# Patient Record
Sex: FEMALE | Race: WHITE | NOT HISPANIC OR LATINO | Employment: OTHER | URBAN - METROPOLITAN AREA
[De-identification: names, ages, dates, MRNs, and addresses within clinical notes are randomized per-mention and may not be internally consistent; named-entity substitution may affect disease eponyms.]

---

## 2019-05-17 ENCOUNTER — TRANSFERRED RECORDS (OUTPATIENT)
Dept: HEALTH INFORMATION MANAGEMENT | Facility: CLINIC | Age: 74
End: 2019-05-17

## 2019-05-29 ENCOUNTER — OFFICE VISIT (OUTPATIENT)
Dept: INTERNAL MEDICINE | Facility: CLINIC | Age: 74
End: 2019-05-29
Payer: COMMERCIAL

## 2019-05-29 VITALS
HEIGHT: 60 IN | TEMPERATURE: 97.5 F | DIASTOLIC BLOOD PRESSURE: 106 MMHG | WEIGHT: 181 LBS | SYSTOLIC BLOOD PRESSURE: 190 MMHG | BODY MASS INDEX: 35.53 KG/M2 | OXYGEN SATURATION: 99 % | HEART RATE: 106 BPM

## 2019-05-29 DIAGNOSIS — Z12.31 VISIT FOR SCREENING MAMMOGRAM: ICD-10-CM

## 2019-05-29 DIAGNOSIS — I10 ESSENTIAL HYPERTENSION: Primary | ICD-10-CM

## 2019-05-29 DIAGNOSIS — Z86.0100 HISTORY OF COLONIC POLYPS: ICD-10-CM

## 2019-05-29 DIAGNOSIS — Z85.3 HX: BREAST CANCER: ICD-10-CM

## 2019-05-29 LAB
ALBUMIN SERPL-MCNC: 4.1 G/DL (ref 3.4–5)
ALP SERPL-CCNC: 97 U/L (ref 40–150)
ALT SERPL W P-5'-P-CCNC: 16 U/L (ref 0–50)
ANION GAP SERPL CALCULATED.3IONS-SCNC: 4 MMOL/L (ref 3–14)
AST SERPL W P-5'-P-CCNC: 16 U/L (ref 0–45)
BILIRUB SERPL-MCNC: 0.4 MG/DL (ref 0.2–1.3)
BUN SERPL-MCNC: 17 MG/DL (ref 7–30)
CALCIUM SERPL-MCNC: 8.9 MG/DL (ref 8.5–10.1)
CHLORIDE SERPL-SCNC: 104 MMOL/L (ref 94–109)
CO2 SERPL-SCNC: 30 MMOL/L (ref 20–32)
CREAT SERPL-MCNC: 0.69 MG/DL (ref 0.52–1.04)
CREAT UR-MCNC: 23 MG/DL
GFR SERPL CREATININE-BSD FRML MDRD: 87 ML/MIN/{1.73_M2}
GLUCOSE SERPL-MCNC: 89 MG/DL (ref 70–99)
MICROALBUMIN UR-MCNC: 7 MG/L
MICROALBUMIN/CREAT UR: 30.57 MG/G CR (ref 0–25)
POTASSIUM SERPL-SCNC: 3.8 MMOL/L (ref 3.4–5.3)
PROT SERPL-MCNC: 7.9 G/DL (ref 6.8–8.8)
SODIUM SERPL-SCNC: 138 MMOL/L (ref 133–144)
TSH SERPL DL<=0.005 MIU/L-ACNC: 0.76 MU/L (ref 0.4–4)

## 2019-05-29 PROCEDURE — 82043 UR ALBUMIN QUANTITATIVE: CPT | Performed by: INTERNAL MEDICINE

## 2019-05-29 PROCEDURE — 80053 COMPREHEN METABOLIC PANEL: CPT | Performed by: INTERNAL MEDICINE

## 2019-05-29 PROCEDURE — 99203 OFFICE O/P NEW LOW 30 MIN: CPT | Performed by: INTERNAL MEDICINE

## 2019-05-29 PROCEDURE — 36415 COLL VENOUS BLD VENIPUNCTURE: CPT | Performed by: INTERNAL MEDICINE

## 2019-05-29 PROCEDURE — 84443 ASSAY THYROID STIM HORMONE: CPT | Performed by: INTERNAL MEDICINE

## 2019-05-29 RX ORDER — DOXYCYCLINE HYCLATE 50 MG/1
50 CAPSULE ORAL 2 TIMES DAILY
COMMUNITY
End: 2021-09-27

## 2019-05-29 RX ORDER — LISINOPRIL 20 MG/1
20 TABLET ORAL DAILY
Qty: 30 TABLET | Refills: 1 | Status: SHIPPED | OUTPATIENT
Start: 2019-05-29 | End: 2019-06-18

## 2019-05-29 RX ORDER — TRIAMTERENE AND HYDROCHLOROTHIAZIDE 37.5; 25 MG/1; MG/1
1 CAPSULE ORAL EVERY MORNING
Qty: 30 CAPSULE | Refills: 1 | Status: SHIPPED | OUTPATIENT
Start: 2019-05-29 | End: 2019-07-29

## 2019-05-29 ASSESSMENT — MIFFLIN-ST. JEOR: SCORE: 1253.54

## 2019-05-29 ASSESSMENT — PAIN SCALES - GENERAL: PAINLEVEL: MODERATE PAIN (4)

## 2019-05-29 NOTE — PROGRESS NOTES
"Subjective     Lianet Mendez is a 73 year old female who presents to clinic today for the following health issues:    HPI   Chief Complaint   Patient presents with     Establish Care     Htn     She had been without a doctor, retired and moved her with her son and daughter in law.      She knew her htn and weight were up. Used to be on medications and bp was down then.      Melanoma on left temple, seeing plastics this next week.      No chest pains, no sob.    Past Medical History:   Diagnosis Date     Breast cancer (H) 1995     Essential hypertension      Melanoma (H) 2019     Current Outpatient Medications   Medication     doxycycline hyclate (VIBRAMYCIN) 50 MG capsule     lisinopril (PRINIVIL/ZESTRIL) 20 MG tablet     triamterene-HCTZ (DYAZIDE) 37.5-25 MG capsule     No current facility-administered medications for this visit.      Social History     Tobacco Use     Smoking status: Never Smoker     Smokeless tobacco: Never Used   Substance Use Topics     Alcohol use: None     Drug use: None     Review of Systems  Constitutional-No fevers, chills, or weight changes..  ENT-No earpain, sore throat, voice changes or rhinitis.  Cardiac-No chest pain or palpitations.  Respiratory-No cough, sob, or hemoptysis.  GI-No nausea, vomitting, diarrhea, constipation, or blood in the stool.    Physical Exam  BP (!) 190/106   Pulse 106   Temp 97.5  F (36.4  C) (Temporal)   Ht 1.518 m (4' 11.75\")   Wt 82.1 kg (181 lb)   SpO2 99%   BMI 35.65 kg/m    General Appearance-healthy, alert, no distress  Cardiac-regular rate and rhythm  with normal S1, S2 ; no murmur, rub or gallops  Lungs-clear to auscultation  GI-Soft, nontender.  Normal bowel sounds.  No hepatosplenomegaly or abnormal masses  Extremities-no peripheral edema, peripheral pulses normal      ASSESSMENT:  This is a new patient, 73-year-old woman is here to establish care.  She has multiple issues going on including melanoma, hypertension, need for screening " examinations.    Melanoma on the left temple she does have a biopsied area and is seen plastic surgery working with dermatology.    Hypertension the patient has elevated blood pressure today at 190/106 she is asymptomatic.  We will start her back on lisinopril 20 and Dyazide 37.5-25, check her labs today recheck her blood pressure in 1 week.    History of breast cancer on the right side we will get her set up for mammogram.    History of colon polyps we will get her set up for a colonoscopy for general screening.    Follow-up in 4 to 6 weeks for a annual wellness examination.    Electronically signed by Michael Zambrano MD

## 2019-05-30 ENCOUNTER — TELEPHONE (OUTPATIENT)
Dept: INTERNAL MEDICINE | Facility: CLINIC | Age: 74
End: 2019-05-30

## 2019-05-30 NOTE — TELEPHONE ENCOUNTER
Called patient. Left message.  Racheal Maya MA     Ekstra sivilari dinlen ve iç  Tamiflujohnson baslatin ya da yarin sonuçlari bekleyin  Influenza sorunlari için yarin gel  Paul Lolling tartisildi  Tylenol veya Motrin agri veya ates için gerektigi gibi  OTC öksürük ilacini emzirirken kullanmak için güvenli oldugu OB ile görüsün ve tartisin  Clois Roebling, bogaz agrisini yatistirmaya yardimci olabilir  Serin sis nemlendirme yararli olabilir  Geralynn Nipper PCP ile takip severo  Kötülesen semptomlar, inatçi ates, SOB veya solunum zorlugu ile ER'ye gidin       AYAKTA TEDAV?:     influenza virüsünden kaynaklanan bir enfeksiyondur  , hasta ki?iden di?erlerine öksürme, hap??rma veya yak?n temas yoluyla kolayl?kla bula? ?r  Bulgu ve belirtiler ortaya ç?kt?ktan 1 hafta veya daha uzun bir süre sonra gribi ba?kalar?na bula? t?rabilirsiniz  Yayg?n bulgu ve belirtiler a?a??dakileri içerir:   · Ate? ve ü?üme    · Ba? a?r?lar?, vücut a?r?lar? ve nissa veya eklem a?r?s?    · Öksürük, burun ak?nt?s? ve irene?az a?r?s?    · ? ?tahs?zl?k, bulant?, kusma veya ishal    · Yorgunluk    · Nefes jcarlos güçlük  A?a??daki belirtilerden birini görürseniz 911'i aray?n:   · Nefes jcarlos güçlük ya? ?yorsunuz ve dudaklar?n?z?n rengi mor Theadore Ke görünüyor  · Nöbet geçiriyorsunuz  ?u durumlarda derhal yard?m isteyin:   · Ba? ?n?z dönüyor veya daha az miktarda idrar yap?yor veya hiç idrar yapm?yorsunuz  · Ensenizde sertlik ve ba? a?r?s?n?n uri? s?ra kendinizi yorgun hissediyorsunuz veya ak?l toya? ??kl??? ya??yorsunuz  · Cleotha Net bir a?r? Velinda Lanius bas?nç rodger  · Nefes jcarlos güçlük, kusma veya ishal gibi belirtileriniz kötüle? iyor  · Ate? ve öksürük gibi belirtileriniz önce iyile? iyor gibi görünse de sonras?nda kötüle?ti   A?a??daki durumlarda sa?l?k görevlinizle görü? ün:   · True Nanas? n?zda Elnoria Lathe a?r? Michael Tam halsizlik rodger  · Katherene Block milan?m?n?z hakk?nda soru ya da endi?eleriniz rodger     tedavisi  a?a??zakia wittini içerebilir:  · Asetaminofen  a?r?y? azalt?r ve ate?i dü?ürür  Reçetesiz olarak temin edilebilir  ?lac? ne kadar ve ne s?kl?kla alman?z gerekti?ini sorun  [place]  Asetaminofen do?ru ?ekilde al?nmazsa karaci?ere zarar verebilir  · NSAID'ler , örne?in ibuprofen; ? i?meyi, a?r?y? azalt?r ve ate?i dü?ürmeye yard?mc? olur  Bu ilaç, doktor tavsiyesi olsa da olmasa da al?nabilir  NSAID'ler, belirli ki?ilerde mide kanamas?na ya da böbrek rahats?zl?klar?na neden olabilir  Jean-Claude suland?r?c? ilaç kullan?yorsan?z, NSAID'lerin sizin için güvenli olup olmad???n? sa?l?k görevlinize mutlaka sorun  ?laç etiketini kesinlikle okuyun ve üzerinde belirtilen talimatlar? uygulay?n  · Virüs Önleyiciler  viral enfeksiyonla sava?man?za yard?mc? olur  Belirtilerinizi yönetme:   · Dinlenin  iyile? menize yard?mc? olmas? için mümkün oldu?unca dinlenin  · Belirtilen ?ekilde s?v? tüketin  peterson Drena Karvonen? önlemeye yard?mc? olur  Her gün içmeniz gereken s?v? miktar? n? ve sizin için en uygun mireille s?v?lar? dan???n   Gribin bula?mas?n? önleme:   · Ellerinizi s?k s?k y?akshat?n  Sabun ve peterson kullan?n  Banyoyu kulland?ktan, çocuk bezi de? i?tirdikten veya hap??rd?ktan sonra ellerinizi y?akshat?n  Yemek haz?rlamadan önce ellerinizi y?akshat?n  Sabun ve peterson yoksa jel el temizleyici [employer] y?kamadan önce gözlerinize, burnunuza ya da a?z?n?za dokunmay?n            · Hap??r?rken veya öksürürken a?z?n?z? kapat?n   Bir mendile veya kolunuzu bükerek iç k?sm?na do?ru öksürün  · Payla?t???n?z nesneleri mikrop öldürücü bir temizlik malzemesiyle temizleyin  [address] Aparicio, edna? babs? n? ve elektrik dü?melerini temizleyin  Havlular?, çatal ka? ??? ve tabaklar? hasta mireille insanlarla payla?may?n  Yatak çar?aflar?n?, havlular?, çatal b?çak james?mlar? n? ve tabaklar? peterson ve sabunla y?akshat?n  · Bir maske james?n  hastaysan?z veya hasta mireille birinin uri?ndaysan?z a?z?n?za ve burnunuza bir maske james?n      · Di?er ki?ilerden uzak durun  hastaysan?z ba?kalar?ndan uzak durun  ·  a??s? influenzay? () önlemeye yard?mc? olur  6 keila büyük herkes y?lda bir kez  a??s? olmal?d?r  A??y? her y?l piyasaya ç?toya ç?kmaz, genellikle Eylül veya Ekim ay?nda olun    Sa?l?k görmauricio belirtti?i ?rosa heredia:  Akl?n?rika gelen sorular? ziyaretlerinizde hat?rlay?p sorabilmek için bir yere not al?n      f

## 2019-05-30 NOTE — TELEPHONE ENCOUNTER
----- Message from Michael Zambrano MD sent at 5/29/2019  5:02 PM CDT -----  Her labs are ok, try the new blood pressure medications.

## 2019-05-31 ENCOUNTER — TELEPHONE (OUTPATIENT)
Dept: INTERNAL MEDICINE | Facility: CLINIC | Age: 74
End: 2019-05-31

## 2019-05-31 NOTE — LETTER

## 2019-05-31 NOTE — TELEPHONE ENCOUNTER
Left message for patient to return call to schedule colonoscopy or EGD. If Fior or Karishma are unavailable, please transfer to the surgery center.

## 2019-05-31 NOTE — LETTER
[address] [address]  [phone]        Madie 3, 2019    Lianet Mendez  [address]  [facility] [address]

## 2019-06-03 ENCOUNTER — TRANSFERRED RECORDS (OUTPATIENT)
Dept: HEALTH INFORMATION MANAGEMENT | Facility: CLINIC | Age: 74
End: 2019-06-03

## 2019-06-03 NOTE — TELEPHONE ENCOUNTER
Left message for patient to return call to schedule EGD/colonoscopy. If Karishma or Fior are not available, please transfer to same day surgery

## 2019-06-03 NOTE — TELEPHONE ENCOUNTER
Date of colonoscopy/EGD: 8/5/19  Surgeon: Dr. Villanueva  Prep:Miralax  Packet:Colonoscopy/EGD instructions mailed to patient's home address.   Date: 6/3/2019      Surgery Scheduler

## 2019-06-07 ENCOUNTER — ALLIED HEALTH/NURSE VISIT (OUTPATIENT)
Dept: FAMILY MEDICINE | Facility: CLINIC | Age: 74
End: 2019-06-07
Payer: COMMERCIAL

## 2019-06-07 VITALS — DIASTOLIC BLOOD PRESSURE: 72 MMHG | SYSTOLIC BLOOD PRESSURE: 130 MMHG | HEART RATE: 100 BPM

## 2019-06-07 DIAGNOSIS — I10 HTN (HYPERTENSION): Primary | ICD-10-CM

## 2019-06-07 PROCEDURE — 99207 ZZC NO CHARGE NURSE ONLY: CPT

## 2019-06-07 ASSESSMENT — PAIN SCALES - GENERAL: PAINLEVEL: NO PAIN (0)

## 2019-06-18 ENCOUNTER — TELEPHONE (OUTPATIENT)
Dept: INTERNAL MEDICINE | Facility: CLINIC | Age: 74
End: 2019-06-18

## 2019-06-18 ENCOUNTER — OFFICE VISIT (OUTPATIENT)
Dept: INTERNAL MEDICINE | Facility: CLINIC | Age: 74
End: 2019-06-18
Payer: COMMERCIAL

## 2019-06-18 VITALS
RESPIRATION RATE: 16 BRPM | TEMPERATURE: 97.8 F | OXYGEN SATURATION: 98 % | BODY MASS INDEX: 34.86 KG/M2 | DIASTOLIC BLOOD PRESSURE: 86 MMHG | WEIGHT: 177 LBS | HEART RATE: 100 BPM | SYSTOLIC BLOOD PRESSURE: 158 MMHG

## 2019-06-18 DIAGNOSIS — Z01.818 PREOP GENERAL PHYSICAL EXAM: Primary | ICD-10-CM

## 2019-06-18 DIAGNOSIS — Z86.0100 HISTORY OF COLONIC POLYPS: ICD-10-CM

## 2019-06-18 DIAGNOSIS — I10 ESSENTIAL HYPERTENSION: ICD-10-CM

## 2019-06-18 DIAGNOSIS — C43.9 MELANOMA OF SKIN (H): ICD-10-CM

## 2019-06-18 DIAGNOSIS — I10 ESSENTIAL HYPERTENSION: Primary | ICD-10-CM

## 2019-06-18 LAB
ANION GAP SERPL CALCULATED.3IONS-SCNC: 7 MMOL/L (ref 3–14)
BUN SERPL-MCNC: 25 MG/DL (ref 7–30)
CALCIUM SERPL-MCNC: 9.3 MG/DL (ref 8.5–10.1)
CHLORIDE SERPL-SCNC: 104 MMOL/L (ref 94–109)
CO2 SERPL-SCNC: 29 MMOL/L (ref 20–32)
CREAT SERPL-MCNC: 0.78 MG/DL (ref 0.52–1.04)
ERYTHROCYTE [DISTWIDTH] IN BLOOD BY AUTOMATED COUNT: 14.5 % (ref 10–15)
GFR SERPL CREATININE-BSD FRML MDRD: 76 ML/MIN/{1.73_M2}
GLUCOSE SERPL-MCNC: 98 MG/DL (ref 70–99)
HCT VFR BLD AUTO: 40.3 % (ref 35–47)
HGB BLD-MCNC: 13.1 G/DL (ref 11.7–15.7)
MCH RBC QN AUTO: 27.6 PG (ref 26.5–33)
MCHC RBC AUTO-ENTMCNC: 32.5 G/DL (ref 31.5–36.5)
MCV RBC AUTO: 85 FL (ref 78–100)
PLATELET # BLD AUTO: 195 10E9/L (ref 150–450)
POTASSIUM SERPL-SCNC: 3.9 MMOL/L (ref 3.4–5.3)
RBC # BLD AUTO: 4.74 10E12/L (ref 3.8–5.2)
SODIUM SERPL-SCNC: 140 MMOL/L (ref 133–144)
WBC # BLD AUTO: 6.3 10E9/L (ref 4–11)

## 2019-06-18 PROCEDURE — 99215 OFFICE O/P EST HI 40 MIN: CPT | Performed by: INTERNAL MEDICINE

## 2019-06-18 PROCEDURE — 85027 COMPLETE CBC AUTOMATED: CPT | Performed by: INTERNAL MEDICINE

## 2019-06-18 PROCEDURE — 80048 BASIC METABOLIC PNL TOTAL CA: CPT | Performed by: INTERNAL MEDICINE

## 2019-06-18 PROCEDURE — 36415 COLL VENOUS BLD VENIPUNCTURE: CPT | Performed by: INTERNAL MEDICINE

## 2019-06-18 PROCEDURE — 93000 ELECTROCARDIOGRAM COMPLETE: CPT | Performed by: INTERNAL MEDICINE

## 2019-06-18 RX ORDER — LISINOPRIL 40 MG/1
40 TABLET ORAL DAILY
Qty: 90 TABLET | Refills: 3 | Status: SHIPPED | OUTPATIENT
Start: 2019-06-18 | End: 2020-06-11

## 2019-06-18 ASSESSMENT — PAIN SCALES - GENERAL: PAINLEVEL: NO PAIN (0)

## 2019-06-18 NOTE — TELEPHONE ENCOUNTER
----- Message from Michael Zambraon MD sent at 6/18/2019  9:09 AM CDT -----  Labs are good for surgery

## 2019-06-18 NOTE — PROGRESS NOTES
[address] [address] [phone]  Dept: [phone]    PRE-OP EVALUATION:  Today's date: 2019    Lianet Mendez (: 1945) presents for pre-operative evaluation assessment as requested by Dr. Chavira.  She requires evaluation and anesthesia risk assessment prior to undergoing surgery/procedure for treatment of melanoma .    Fax number for surgical facility: [phone] and [phone]  Primary Physician: Michael Zambrano  Type of Anesthesia Anticipated: to be determined    Patient has a Health Care Directive or Living Will:  NO    Preop Questions 2019   Who is doing your surgery? Dani Chavira   What are you having done? removal of melanoma   Date of Surgery/Procedure: 19   Facility or Hospital where procedure/surgery will be performed: [facility]   1.  Do you have a history of Heart attack, stroke, stent, coronary bypass surgery, or other heart surgery? No   2.  Do you ever have any pain or discomfort in your chest? No   3.  Do you have a history of  Heart Failure? No   4.   Are you troubled by shortness of breath when:  walking on a level surface, or up a slight hill, or at night? No   5.  Do you currently have a cold, bronchitis or other respiratory infection? No   6.  Do you have a cough, shortness of breath, or wheezing? No   7.  Do you sometimes get pains in the calves of your legs when you walk? UNKNOWN -    8. Do you or anyone in your family have previous history of blood clots? No   9.  Do you or does anyone in your family have a serious bleeding problem such as prolonged bleeding following surgeries or cuts? No   10. Have you ever had problems with anemia or been told to take iron pills? No   11. Have you had any abnormal blood loss such as black, tarry or bloody stools, or abnormal vaginal bleeding? No   12. Have you ever had a blood transfusion? No   13. Have you or any of your relatives ever had problems with  anesthesia? No   14. Do you have sleep apnea, excessive snoring or daytime drowsiness? No   15. Do you have any prosthetic heart valves? No   16. Do you have prosthetic joints? No   17. Is there any chance that you may be pregnant? No         HPI:     HPI related to upcoming procedure:Melanoma on the left temple and eyelid area    HYPERTENSION - Patient has longstanding history of HTN , currently denies any symptoms referable to elevated blood pressure. Specifically denies chest pain, palpitations, dyspnea, orthopnea, PND or peripheral edema. Blood pressure readings have not been in normal range. Current medication regimen is as listed below. Patient denies any side effects of medication.   Getting treatment now. Readings at home have been better yesterday 135/87.    MEDICAL HISTORY:     Patient Active Problem List    Diagnosis Date Noted     History of colonic polyps 05/29/2019     Priority: Medium      Past Medical History:   Diagnosis Date     Breast cancer (H) 1995     Essential hypertension      Melanoma (H) 2019     Past Surgical History:   Procedure Laterality Date     COLECTOMY      polyps.     masectomy       Current Outpatient Medications   Medication Sig Dispense Refill     doxycycline hyclate (VIBRAMYCIN) 50 MG capsule Take 50 mg by mouth 2 times daily       lisinopril (PRINIVIL/ZESTRIL) 20 MG tablet Take 1 tablet (20 mg) by mouth daily 30 tablet 1     triamterene-HCTZ (DYAZIDE) 37.5-25 MG capsule Take 1 capsule by mouth every morning 30 capsule 1     OTC products: None, except as noted above    No Known Allergies   Latex Allergy: NO    Social History     Tobacco Use     Smoking status: Never Smoker     Smokeless tobacco: Never Used   Substance Use Topics     Alcohol use: Not on file     History   Drug Use Not on file       REVIEW OF SYSTEMS:   Constitutional, neuro, ENT, endocrine, pulmonary, cardiac, gastrointestinal, genitourinary, musculoskeletal, integument and psychiatric systems are negative,  except as otherwise noted.    EXAM:   /86   Pulse 100   Temp 97.8  F (36.6  C) (Temporal)   Resp 16   Wt 80.3 kg (177 lb)   SpO2 98%   BMI 34.86 kg/m      GENERAL APPEARANCE: healthy, alert and no distress     HENT: ear canals and TM's normal and nose and mouth without ulcers or lesions     NECK: no adenopathy, no asymmetry, masses, or scars and thyroid normal to palpation     RESP: lungs clear to auscultation - no rales, rhonchi or wheezes     CV: regular rates and rhythm, normal S1 S2, no S3 or S4 and no murmur, click or rub     ABDOMEN:  soft, nontender, no HSM or masses and bowel sounds normal     MS: extremities normal- no gross deformities noted, no evidence of inflammation in joints, FROM in all extremities.     SKIN: no suspicious lesions or rashes     NEURO: Normal strength and tone, sensory exam grossly normal, mentation intact and speech normal     PSYCH: mentation appears normal. and affect normal/bright     LYMPHATICS: No cervical adenopathy    DIAGNOSTICS:   EKG: appears normal, NSR, normal axis, normal intervals, no acute ST/T changes c/w ischemia, no LVH by voltage criteria, unchanged from previous tracings    Recent Labs   Lab Test 05/29/19  1550      POTASSIUM 3.8   CR 0.69        IMPRESSION:   Reason for surgery/procedure: melanoma on the left face    The proposed surgical procedure is considered LOW risk.    REVISED CARDIAC RISK INDEX  The patient has the following serious cardiovascular risks for perioperative complications such as (MI, PE, VFib and 3  AV Block):  No serious cardiac risks  INTERPRETATION: 1 risks: Class II (low risk - 0.9% complication rate)    The patient has the following additional risks for perioperative complications:  Hypertension newly treated    No diagnosis found.    RECOMMENDATIONS:         --Patient is to take all scheduled medications on the day of surgery EXCEPT for modifications listed below.    ACE Inhibitor or Angiotensin Receptor Blocker (ARB)  Use  Ace inhibitor or Angiotensin Receptor Blocker (ARB) and should HOLD this medication for the 24 hours prior to surgery.      APPROVAL GIVEN to proceed with proposed procedure, without further diagnostic evaluation       Signed Electronically by: Michael Zambrano MD    Copy of this evaluation report is provided to requesting physician.    [facility] Preop Guidelines    Revised Cardiac Risk Index

## 2019-06-18 NOTE — TELEPHONE ENCOUNTER
Reason for call:  Other   Patient called regarding (reason for call): call back  Additional comments:   Patient is calling for his U/S results from this morning, Patient states they were told it would be by noon. Please call with an update as to when they might receive a call with the results.   PATIENT LEAVING TOMORROW.     Phone number to reach patient:  Cell number on file:    No relevant phone numbers on file.     Lianet Mendez (Self) [phone] (H)       Best Time:  ASAP     Can we leave a detailed message on this number?  YES

## 2019-06-18 NOTE — TELEPHONE ENCOUNTER
Patient called and message relayed to her. Patient asking if the Lisinopril 40mg will be sent to pharmacy soon per their discussion. Pt uses [facility] pharmacy in [place].

## 2019-06-19 NOTE — TELEPHONE ENCOUNTER
Please see what she wants.  There wasn't an ultrasound done.  Is this the wrong patient for message?

## 2019-06-19 NOTE — TELEPHONE ENCOUNTER
I called patient to get clarification on the call and she states that it was not to get results on an ultrasound but it was to find out if she could take Dramamine. [place] airport [place]. Per Dr. Zambrano, patient was informed that it should be fine if she takes it.     Sharee Lanier, [facility]

## 2019-07-01 ENCOUNTER — TRANSFERRED RECORDS (OUTPATIENT)
Dept: HEALTH INFORMATION MANAGEMENT | Facility: CLINIC | Age: 74
End: 2019-07-01

## 2019-07-29 DIAGNOSIS — I10 ESSENTIAL HYPERTENSION: ICD-10-CM

## 2019-07-30 NOTE — TELEPHONE ENCOUNTER
"DYAZIDE  Last Written Prescription Date:  5/29/19  Last Fill Quantity: 30,  # refills: 1   Last office visit: 6/18/2019 with prescribing provider:     Future Office Visit:  NONE  Requested Prescriptions   Pending Prescriptions Disp Refills     triamterene-HCTZ (DYAZIDE) 37.5-25 MG capsule [Pharmacy Med Name: TRIAMTERENE-HCTZ 37.5-25MG CAPS] 30 capsule 1     Sig: TAKE ONE CAPSULE BY MOUTH EVERY MORNING       Diuretics (Including Combos) Protocol Failed - 7/29/2019 10:49 AM        Failed - Blood pressure under 140/90 in past 12 months     BP Readings from Last 3 Encounters:   06/18/19 158/86   06/07/19 130/72   05/29/19 (!) 190/106           Passed - Recent (12 mo) or future (30 days) visit within the authorizing provider's specialty     Patient had office visit in the last 12 months or has a visit in the next 30 days with authorizing provider or within the authorizing provider's specialty.  See \"Patient Info\" tab in inbasket, or \"Choose Columns\" in Meds & Orders section of the refill encounter.            Passed - Medication is active on med list        Passed - Patient is age 18 or older        Passed - No active pregancy on record        Passed - Normal serum creatinine on file in past 12 months     Recent Labs   Lab Test 06/18/19  0738   CR 0.78            Passed - Normal serum potassium on file in past 12 months     Recent Labs   Lab Test 06/18/19  0738   POTASSIUM 3.9            Passed - Normal serum sodium on file in past 12 months     Recent Labs   Lab Test 06/18/19  0738                 Passed - No positive pregnancy test in past 12 months        Routing refill request to provider for review/approval because:  Last 2 BP were above goal.  VIGNESH Reilly             "

## 2019-07-31 RX ORDER — TRIAMTERENE AND HYDROCHLOROTHIAZIDE 37.5; 25 MG/1; MG/1
CAPSULE ORAL
Qty: 30 CAPSULE | Refills: 1 | Status: SHIPPED | OUTPATIENT
Start: 2019-07-31 | End: 2019-10-11

## 2019-08-11 ENCOUNTER — ANESTHESIA EVENT (OUTPATIENT)
Dept: GASTROENTEROLOGY | Facility: CLINIC | Age: 74
End: 2019-08-11
Payer: COMMERCIAL

## 2019-08-12 ENCOUNTER — ANESTHESIA (OUTPATIENT)
Dept: GASTROENTEROLOGY | Facility: CLINIC | Age: 74
End: 2019-08-12
Payer: COMMERCIAL

## 2019-08-12 ENCOUNTER — SURGERY (OUTPATIENT)
Age: 74
End: 2019-08-12
Payer: COMMERCIAL

## 2019-08-12 ENCOUNTER — HOSPITAL ENCOUNTER (OUTPATIENT)
Facility: CLINIC | Age: 74
Discharge: HOME | End: 2019-08-12
Attending: FAMILY MEDICINE | Admitting: FAMILY MEDICINE
Payer: COMMERCIAL

## 2019-08-12 VITALS
SYSTOLIC BLOOD PRESSURE: 110 MMHG | HEART RATE: 65 BPM | OXYGEN SATURATION: 100 % | TEMPERATURE: 98.3 F | DIASTOLIC BLOOD PRESSURE: 63 MMHG | RESPIRATION RATE: 16 BRPM

## 2019-08-12 PROBLEM — C43.9 MELANOMA OF SKIN (H): Status: ACTIVE | Noted: 2019-08-12

## 2019-08-12 LAB — COLONOSCOPY: NORMAL

## 2019-08-12 PROCEDURE — 37000009 ZZH ANESTHESIA TECHNICAL FEE, EACH ADDTL 15 MIN: Performed by: FAMILY MEDICINE

## 2019-08-12 PROCEDURE — 25000128 H RX IP 250 OP 636: Performed by: NURSE ANESTHETIST, CERTIFIED REGISTERED

## 2019-08-12 PROCEDURE — 45385 COLONOSCOPY W/LESION REMOVAL: CPT | Mod: PT | Performed by: FAMILY MEDICINE

## 2019-08-12 PROCEDURE — 88305 TISSUE EXAM BY PATHOLOGIST: CPT | Performed by: FAMILY MEDICINE

## 2019-08-12 PROCEDURE — 45378 DIAGNOSTIC COLONOSCOPY: CPT | Performed by: FAMILY MEDICINE

## 2019-08-12 PROCEDURE — 25800030 ZZH RX IP 258 OP 636: Performed by: NURSE ANESTHETIST, CERTIFIED REGISTERED

## 2019-08-12 PROCEDURE — 88305 TISSUE EXAM BY PATHOLOGIST: CPT | Mod: 26 | Performed by: FAMILY MEDICINE

## 2019-08-12 PROCEDURE — 25000125 ZZHC RX 250: Performed by: NURSE ANESTHETIST, CERTIFIED REGISTERED

## 2019-08-12 PROCEDURE — 45385 COLONOSCOPY W/LESION REMOVAL: CPT | Performed by: FAMILY MEDICINE

## 2019-08-12 PROCEDURE — 37000008 ZZH ANESTHESIA TECHNICAL FEE, 1ST 30 MIN: Performed by: FAMILY MEDICINE

## 2019-08-12 RX ORDER — SODIUM CHLORIDE, SODIUM LACTATE, POTASSIUM CHLORIDE, CALCIUM CHLORIDE 600; 310; 30; 20 MG/100ML; MG/100ML; MG/100ML; MG/100ML
INJECTION, SOLUTION INTRAVENOUS CONTINUOUS
Status: DISCONTINUED | OUTPATIENT
Start: 2019-08-12 | End: 2019-08-12 | Stop reason: HOSPADM

## 2019-08-12 RX ORDER — MEPERIDINE HYDROCHLORIDE 25 MG/ML
12.5 INJECTION INTRAMUSCULAR; INTRAVENOUS; SUBCUTANEOUS
Status: DISCONTINUED | OUTPATIENT
Start: 2019-08-12 | End: 2019-08-12 | Stop reason: HOSPADM

## 2019-08-12 RX ORDER — ONDANSETRON 4 MG/1
4 TABLET, ORALLY DISINTEGRATING ORAL EVERY 30 MIN PRN
Status: DISCONTINUED | OUTPATIENT
Start: 2019-08-12 | End: 2019-08-12 | Stop reason: HOSPADM

## 2019-08-12 RX ORDER — PROPOFOL 10 MG/ML
INJECTION, EMULSION INTRAVENOUS PRN
Status: DISCONTINUED | OUTPATIENT
Start: 2019-08-12 | End: 2019-08-12

## 2019-08-12 RX ORDER — LIDOCAINE 40 MG/G
CREAM TOPICAL
Status: DISCONTINUED | OUTPATIENT
Start: 2019-08-12 | End: 2019-08-12 | Stop reason: HOSPADM

## 2019-08-12 RX ORDER — LIDOCAINE HYDROCHLORIDE 20 MG/ML
INJECTION, SOLUTION INFILTRATION; PERINEURAL PRN
Status: DISCONTINUED | OUTPATIENT
Start: 2019-08-12 | End: 2019-08-12

## 2019-08-12 RX ORDER — ONDANSETRON 2 MG/ML
4 INJECTION INTRAMUSCULAR; INTRAVENOUS
Status: DISCONTINUED | OUTPATIENT
Start: 2019-08-12 | End: 2019-08-12 | Stop reason: HOSPADM

## 2019-08-12 RX ORDER — ONDANSETRON 2 MG/ML
4 INJECTION INTRAMUSCULAR; INTRAVENOUS EVERY 30 MIN PRN
Status: DISCONTINUED | OUTPATIENT
Start: 2019-08-12 | End: 2019-08-12 | Stop reason: HOSPADM

## 2019-08-12 RX ORDER — NALOXONE HYDROCHLORIDE 0.4 MG/ML
.1-.4 INJECTION, SOLUTION INTRAMUSCULAR; INTRAVENOUS; SUBCUTANEOUS
Status: DISCONTINUED | OUTPATIENT
Start: 2019-08-12 | End: 2019-08-12 | Stop reason: HOSPADM

## 2019-08-12 RX ORDER — PROPOFOL 10 MG/ML
INJECTION, EMULSION INTRAVENOUS CONTINUOUS PRN
Status: DISCONTINUED | OUTPATIENT
Start: 2019-08-12 | End: 2019-08-12

## 2019-08-12 RX ADMIN — PROPOFOL 50 MG: 10 INJECTION, EMULSION INTRAVENOUS at 12:31

## 2019-08-12 RX ADMIN — LIDOCAINE HYDROCHLORIDE 40 MG: 20 INJECTION, SOLUTION INFILTRATION; PERINEURAL at 12:31

## 2019-08-12 RX ADMIN — SODIUM CHLORIDE, POTASSIUM CHLORIDE, SODIUM LACTATE AND CALCIUM CHLORIDE: 600; 310; 30; 20 INJECTION, SOLUTION INTRAVENOUS at 12:07

## 2019-08-12 RX ADMIN — PROPOFOL 150 MCG/KG/MIN: 10 INJECTION, EMULSION INTRAVENOUS at 12:31

## 2019-08-12 NOTE — ANESTHESIA PREPROCEDURE EVALUATION
Anesthesia Pre-Procedure Evaluation    Patient: Lianet Mendez   MRN: [id number] : 1945          Preoperative Diagnosis: History of colonic polyps    Procedure(s):  COLONOSCOPY    Past Medical History:   Diagnosis Date     Breast cancer (H)      Essential hypertension      Melanoma (H) 2019     Past Surgical History:   Procedure Laterality Date     COLECTOMY      polyps.     masectomy         Anesthesia Evaluation     . Pt has had prior anesthetic. Type: General    No history of anesthetic complications          ROS/MED HX    ENT/Pulmonary:  - neg pulmonary ROS     Neurologic:  - neg neurologic ROS     Cardiovascular:     (+) hypertension----. : . . . :. . Previous cardiac testing date:results:date: results:ECG reviewed date:19 results:NSR date: results:          METS/Exercise Tolerance:     Hematologic:  - neg hematologic  ROS       Musculoskeletal:  - neg musculoskeletal ROS       GI/Hepatic:  - neg GI/hepatic ROS       Renal/Genitourinary:  - ROS Renal section negative       Endo:  - neg endo ROS       Psychiatric:  - neg psychiatric ROS       Infectious Disease:  - neg infectious disease ROS       Malignancy:   (+) Malignancy History of Breast  Breast CA Remission status post Surgery.         Other:    - neg other ROS                      Physical Exam  Normal systems: cardiovascular and pulmonary    Airway   Mallampati: II  TM distance: >3 FB  Neck ROM: full    Dental   (+) caps    Cardiovascular   Rhythm and rate: regular and normal      Pulmonary    breath sounds clear to auscultation            Lab Results   Component Value Date    WBC 6.3 2019    HGB 13.1 2019    HCT 40.3 2019     2019     2019    POTASSIUM 3.9 2019    CHLORIDE 104 2019    CO2 29 2019    BUN 25 2019    CR 0.78 2019    GLC 98 2019    LIDA 9.3 2019    ALBUMIN 4.1 2019    PROTTOTAL 7.9 2019    ALT 16 2019    AST 16 2019  "   ALKPHOS 97 05/29/2019    BILITOTAL 0.4 05/29/2019    TSH 0.76 05/29/2019       Preop Vitals  BP Readings from Last 3 Encounters:   06/18/19 158/86   06/07/19 130/72   05/29/19 (!) 190/106    Pulse Readings from Last 3 Encounters:   06/18/19 100   06/07/19 100   05/29/19 106      Resp Readings from Last 3 Encounters:   06/18/19 16    SpO2 Readings from Last 3 Encounters:   06/18/19 98%   05/29/19 99%   12/27/11 100%      Temp Readings from Last 1 Encounters:   06/18/19 97.8  F (36.6  C) (Temporal)    Ht Readings from Last 1 Encounters:   05/29/19 1.518 m (4' 11.75\")      Wt Readings from Last 1 Encounters:   06/18/19 80.3 kg (177 lb)    Estimated body mass index is 34.86 kg/m  as calculated from the following:    Height as of 5/29/19: 1.518 m (4' 11.75\").    Weight as of 6/18/19: 80.3 kg (177 lb).       Anesthesia Plan      History & Physical Review  History and physical reviewed and following examination; no interval change.    ASA Status:  2 .    NPO Status:  > 8 hours    Plan for MAC Maintenance will be TIVA.  Reason for MAC:  Deep or markedly invasive procedure (G8)         Postoperative Care      Consents  Anesthetic plan, risks, benefits and alternatives discussed with:  Patient.  Use of blood products discussed: No .   .                 SAHARA Koroma CRNA  "

## 2019-08-12 NOTE — ANESTHESIA POSTPROCEDURE EVALUATION
Patient: Lianet Mendez    Procedure(s):  COLONOSCOPY    Diagnosis:History of colonic polyps  Diagnosis Additional Information: No value filed.    Anesthesia Type:  MAC    Note:  Anesthesia Post Evaluation    Patient location during evaluation: Phase 2 and Bedside  Patient participation: Able to fully participate in evaluation  Level of consciousness: awake and alert  Pain management: adequate  Airway patency: patent  Cardiovascular status: acceptable  Respiratory status: acceptable  Hydration status: acceptable  PONV: none     Anesthetic complications: None    Comments: Patient was pleased with her care today. There were no anesthesia related complications noted. Will follow as needed.        Last vitals:  Vitals:    08/12/19 1204 08/12/19 1300   BP: 125/77 107/58   Pulse:  82   Resp: 16    Temp: 98.3  F (36.8  C)    SpO2:  100%         Electronically Signed By: SAHARA Webster CRNA  August 12, 2019  1:11 PM

## 2019-08-12 NOTE — DISCHARGE INSTRUCTIONS
[facility]    Home Care Following Endoscopy          Activity:    You have just undergone an endoscopic procedure usually performed with conscious sedation.  Do not work or operate machinery (including a car) for at least 12 hours.      I encourage you to walk and attempt to pass this air as soon as possible.    Diet:    Return to the diet you were on before your procedure but eat lightly for the first 12-24 hours.    Drink plenty of water.    Resume any regular medications unless otherwise advised by your physician.  Please begin any new medication prescribed as a result of your procedure as directed by your physician.     If you had any biopsy or polyp removed please refrain from aspirin or aspirin products for 2 days.  If on Coumadin please restart as instructed by your physician.   Pain:    You may take Tylenol as needed for pain.  Expected Recovery:    You can expect some mild abdominal fullness and/or discomfort due to the air used to inflate your intestinal tract. It is also normal to have a mild sore throat after upper endoscopy.    Call Your Physician if You Have:    After Colonoscopy:  o Worsening persisting abdominal pain which is worse with activity.  o Fevers (>101 degrees F), chills or shakes.  o Passage of continued blood with bowel movements.   Any questions or concerns about your recovery, please call [phone] or after hours [phone] Nurse Advice Line.    Follow-up Care:    You should receive a call or letter with your results within 1 week. Please call if you have not received a notification of your results.

## 2019-08-12 NOTE — ANESTHESIA CARE TRANSFER NOTE
Patient: Lianet Mendez    Procedure(s):  COLONOSCOPY    Diagnosis: History of colonic polyps  Diagnosis Additional Information: No value filed.    Anesthesia Type:   MAC     Note:  Airway :Face Mask  Patient transferred to:Phase II  Handoff Report: Identifed the Patient, Identified the Reponsible Provider, Reviewed the pertinent medical history, Discussed the surgical course, Reviewed Intra-OP anesthesia mangement and issues during anesthesia, Set expectations for post-procedure period and Allowed opportunity for questions and acknowledgement of understanding      Vitals: (Last set prior to Anesthesia Care Transfer)    CRNA VITALS  8/12/2019 1228 - 8/12/2019 1311      8/12/2019             Resp Rate (observed):  9                Electronically Signed By: SAHARA Webster CRNA  August 12, 2019  1:11 PM

## 2019-08-12 NOTE — H&P
"Pre-Endoscopy History and Physical     Lianet Mendez MRN# [id number]   YOB: 1945 Age: 73 year old     Date of Procedure: 8/12/2019  Primary care provider: Michael Zambrano  Type of Endoscopy: colonoscopy  Type of Anesthesia Anticipated: MAC     HPI:    Lianet is a 73 year old female who will be undergoing a Screening procedure.      Lianet is feeling well today. Can get up a single flight of stairs without dyspnea. Estimated METS > 4.     Patient Active Problem List   Diagnosis     History of colonic polyps          REVIEW OF SYSTEMS:     5 point ROS negative except as noted above in HPI, including Gen., Resp., CV, GI &  system review.      PHYSICAL EXAM:   /77   Temp 98.3  F (36.8  C) (Oral)   Resp 16    Estimated body mass index is 34.86 kg/m  as calculated from the following:    Height as of 5/29/19: 1.518 m (4' 11.75\").    Weight as of 6/18/19: 80.3 kg (177 lb).    GENERAL APPEARANCE: alert and no distress  RESP: lungs clear and unlabored  CV: regular  ABD: soft, nt/nd    DIAGNOSTICS:    Not indicated      IMPRESSION   ASA Class 1 - Healthy patient, no medical problems        PLAN:     Plan for colonoscopy. No medical contraindications to proceed, or further work up needed. The risks and benefits of the procedure and the sedation options and risks were discussed with the patient. These include infection, bleeding, and small risk of colon perforation (1/1000 to 1/16396 depending on patient characteristics and type of procedure). Lianet was also explained to alternatives for colo-rectal screening. All questions were answered and informed consent was obtained.      The above has been forwarded to the consulting provider.      Signed Electronically by: Mario Alberto Villanueva  August 12, 2019     "

## 2019-08-14 LAB — COPATH REPORT: NORMAL

## 2019-10-11 DIAGNOSIS — I10 ESSENTIAL HYPERTENSION: ICD-10-CM

## 2019-10-11 RX ORDER — TRIAMTERENE AND HYDROCHLOROTHIAZIDE 37.5; 25 MG/1; MG/1
CAPSULE ORAL
Qty: 30 CAPSULE | Refills: 5 | Status: SHIPPED | OUTPATIENT
Start: 2019-10-11 | End: 2020-06-11

## 2019-10-11 NOTE — TELEPHONE ENCOUNTER
"DYAZIDE  Last Written Prescription Date:  07/31/2019  Last Fill Quantity: 30,  # refills: 1   Last office visit: 6/18/2019 with prescribing provider:  Juan Manuel   Future Office Visit:    Prescription approved per [facility] Refill Protocol.  Requested Prescriptions   Pending Prescriptions Disp Refills     triamterene-HCTZ (DYAZIDE) 37.5-25 MG capsule [Pharmacy Med Name: TRIAMTERENE-HCTZ 37.5-25MG CAPS] 30 capsule 1     Sig: TAKE ONE CAPSULE BY MOUTH EVERY MORNING       Diuretics (Including Combos) Protocol Passed - 10/11/2019 11:27 AM        Passed - Blood pressure under 140/90 in past 12 months     BP Readings from Last 3 Encounters:   08/12/19 110/63   06/18/19 158/86   06/07/19 130/72                 Passed - Recent (12 mo) or future (30 days) visit within the authorizing provider's specialty     Patient has had an office visit with the authorizing provider or a provider within the authorizing providers department within the previous 12 mos or has a future within next 30 days. See \"Patient Info\" tab in inbasket, or \"Choose Columns\" in Meds & Orders section of the refill encounter.              Passed - Medication is active on med list        Passed - Patient is age 18 or older        Passed - No active pregancy on record        Passed - Normal serum creatinine on file in past 12 months     Recent Labs   Lab Test 06/18/19  0738   CR 0.78              Passed - Normal serum potassium on file in past 12 months     Recent Labs   Lab Test 06/18/19  0738   POTASSIUM 3.9                    Passed - Normal serum sodium on file in past 12 months     Recent Labs   Lab Test 06/18/19  0738                 Passed - No positive pregnancy test in past 12 months          "

## 2020-02-24 ENCOUNTER — HEALTH MAINTENANCE LETTER (OUTPATIENT)
Age: 75
End: 2020-02-24

## 2020-06-11 ENCOUNTER — VIRTUAL VISIT (OUTPATIENT)
Dept: INTERNAL MEDICINE | Facility: CLINIC | Age: 75
End: 2020-06-11
Payer: COMMERCIAL

## 2020-06-11 DIAGNOSIS — M25.551 HIP PAIN, RIGHT: ICD-10-CM

## 2020-06-11 DIAGNOSIS — I10 ESSENTIAL HYPERTENSION: Primary | ICD-10-CM

## 2020-06-11 DIAGNOSIS — C43.9 MELANOMA OF SKIN (H): ICD-10-CM

## 2020-06-11 PROCEDURE — 99214 OFFICE O/P EST MOD 30 MIN: CPT | Mod: 95 | Performed by: INTERNAL MEDICINE

## 2020-06-11 RX ORDER — TRIAMTERENE AND HYDROCHLOROTHIAZIDE 37.5; 25 MG/1; MG/1
CAPSULE ORAL
Qty: 90 CAPSULE | Refills: 3 | Status: SHIPPED | OUTPATIENT
Start: 2020-06-11 | End: 2021-09-10

## 2020-06-11 RX ORDER — PHENOL 1.4 %
10 AEROSOL, SPRAY (ML) MUCOUS MEMBRANE
COMMUNITY

## 2020-06-11 RX ORDER — LISINOPRIL 40 MG/1
40 TABLET ORAL DAILY
Qty: 90 TABLET | Refills: 3 | Status: SHIPPED | OUTPATIENT
Start: 2020-06-11 | End: 2021-09-10

## 2020-06-11 NOTE — PROGRESS NOTES
"Lianet Mendez is a 74 year old female who is being evaluated via a billable video visit.      The patient has been notified of following:     \"This video visit will be conducted via a call between you and your physician/provider. We have found that certain health care needs can be provided without the need for an in-person physical exam.  This service lets us provide the care you need with a video conversation.  If a prescription is necessary we can send it directly to your pharmacy.  If lab work is needed we can place an order for that and you can then stop by our lab to have the test done at a later time.    Video visits are billed at different rates depending on your insurance coverage.  Please reach out to your insurance provider with any questions.    If during the course of the call the physician/provider feels a video visit is not appropriate, you will not be charged for this service.\"    Patient has given verbal consent for Video visit? Yes  Phone number:  [phone]  Will anyone else be joining your video visit? No    Subjective     Lianet Mendez is a 74 year old female who presents today via video visit for the following health issues:    HPI  Chief Complaint   Patient presents with     Video Visit     medication f/u and discuss left ankle problems       Video Start Time: 10:14 AM    New to our clinic last year, htn and got her back on treatment.     Had melanoma removed with plastics. She did have dermatology followup and body scan done.     Needs refill of medications for hypertension.   BP is creeping up, weight is up a little she thinks, feels good.    Staying safe and at home.  [place].     Some discomfort in her hip and a limp when she walks.right hip, anterior with walking, sometimes when sitting as well.  Sharper when standing up.  More careful on the steps.        Previously broke her left ankle and more swelling with humid weather.         Patient Active Problem List " "  Diagnosis     History of colonic polyps     Melanoma of skin (H)- L eyebrow     Past Surgical History:   Procedure Laterality Date     COLECTOMY      polyps.     COLONOSCOPY N/A 8/12/2019    Procedure: COLONOSCOPY;  Surgeon: Mario Alberto Villanueva MD;  Location: [facility]     masectomy         Social History     Tobacco Use     Smoking status: Never Smoker     Smokeless tobacco: Never Used   Substance Use Topics     Alcohol use: Not on file     History reviewed. No pertinent family history.      Current Outpatient Medications   Medication Sig Dispense Refill     doxycycline hyclate (VIBRAMYCIN) 50 MG capsule Take 50 mg by mouth 2 times daily       lisinopril (PRINIVIL/ZESTRIL) 40 MG tablet Take 1 tablet (40 mg) by mouth daily 90 tablet 3     Melatonin 10 MG TABS tablet Take 10 mg by mouth nightly as needed for sleep       triamterene-HCTZ (DYAZIDE) 37.5-25 MG capsule TAKE ONE CAPSULE BY MOUTH EVERY MORNING 30 capsule 5     No Known Allergies    Reviewed and updated as needed this visit by Provider         Review of Systems   Constitutional, HEENT, cardiovascular, pulmonary, gi and gu systems are negative, except as otherwise noted.      Objective    There were no vitals taken for this visit.  Estimated body mass index is 34.86 kg/m  as calculated from the following:    Height as of 5/29/19: 1.518 m (4' 11.75\").    Weight as of 6/18/19: 80.3 kg (177 lb).  Physical Exam     GENERAL: Healthy, alert and no distress  EYES: Eyes grossly normal to inspection.  No discharge or erythema, or obvious scleral/conjunctival abnormalities.  RESP: No audible wheeze, cough, or visible cyanosis.  No visible retractions or increased work of breathing.    SKIN: Visible skin clear. No significant rash, abnormal pigmentation or lesions.  NEURO: Cranial nerves grossly intact.  Mentation and speech appropriate for age.  PSYCH: Mentation appears normal, affect normal/bright, judgement and insight intact, normal speech and appearance " well-groomed.      Diagnostic Test Results:  Labs reviewed in Epic        Assessment & Plan       ICD-10-CM    1. Essential hypertension  I10 lisinopril (ZESTRIL) 40 MG tablet     triamterene-HCTZ (DYAZIDE) 37.5-25 MG capsule   2. Hip pain, right  M25.551    3. Melanoma of skin (H)  C43.9      Patient has a long history of high blood pressure.  She is done better on lisinopril and Dyazide.  Unfortunately she ran out of the Dyazide and her blood pressures been higher than last week.  We will refill this for the next year, I will see her back in 1 to 2 months for a physical.  We will then check her blood pressure and labs.    Ankle swelling is from old ankle fracture.  The gets also compounded by her not being on the Dyazide.    Hip pain appears to be anterior in the right hip I would like to examine her hip and get an x-ray I think it is probably arthritis of the hip she can use naproxen for the pain and anti-inflammatory effect.    Melanoma of the skin she did have treated she follows with dermatology and see them again later in the summer.        Return in about 3 months (around 9/11/2020) for Physical Exam.    Michael Zambrano MD  [facility]      Video-Visit Details    Type of service:  Video Visit    Video End Time:10:36 AM    Originating Location (pt. Location): Home    Distant Location (provider location):  [facility]     Platform used for Video Visit: Doximity    No follow-ups on file.       Michael Zambrano MD

## 2020-12-20 ENCOUNTER — VIRTUAL VISIT (OUTPATIENT)
Dept: FAMILY MEDICINE | Facility: OTHER | Age: 75
End: 2020-12-20
Payer: COMMERCIAL

## 2020-12-20 PROCEDURE — 99421 OL DIG E/M SVC 5-10 MIN: CPT | Performed by: NURSE PRACTITIONER

## 2020-12-21 NOTE — PROGRESS NOTES
"Date: 2020 21:37:32  Clinician: Deysi Gonzalez  Clinician NPI: [id number]  Patient: Lianet Mendez  Patient : 1945  Patient Address: [address] [place] [address]  Patient Phone: [phone]  Visit Protocol: URI  Patient Summary:  Lianet is a 75 year old ( : 1945 ) female who initiated a [facility] Visit for COVID-19 (Coronavirus) evaluation and screening. When asked the question \"Please sign me up to receive news, health information and promotions from [facility].\", Lianet responded \"No\".    When asked when her symptoms started, Lianet reported that she does not have any symptoms.   She denies having recent facial or sinus surgery in the past 60 days and taking antibiotic medication in the past month.    Pertinent COVID-19 (Coronavirus) information  Lianet does not work or volunteer as healthcare worker or a . In the past 14 days, Lianet has not worked or volunteered at a healthcare facility or group living setting.   In the past 14 days, she also has not lived in a congregate living setting.   Lianet has had a close contact with a laboratory-confirmed COVID-19 patient in the last 14 days. Date Lianet was exposed to the laboratory-confirmed COVID-19 patient: 2020   Additional information about contact with COVID-19 (Coronavirus) patient as reported by the patient (free text): I live with my 19 year old granddaughter and her parents and sister.  Friday she tested positive with no symptoms. She was tested as a routine in [facility] for her to regain access to her dorm. She had been home since the week before .   Lianet is living in the same household with the COVID-19 positive patient. She was in an enclosed space for greater than 15 minutes with the COVID-19 patient.   During the encounter, neither were wearing masks.   Lianet has not been tested for COVID-19.   Pertinent medical history  She has not been told by her provider to avoid " NSAIDs.   Lianet does not get yeast infections when she takes antibiotics.   Lianet does not have diabetes. She denies having immunosuppressive conditions (e.g., chemotherapy, HIV, organ transplant, long-term use of steroids or other immunosuppressive medications, splenectomy). She denies having congestive heart failure and severe COPD. She does not have asthma.   Lianet does not need a return to work/school note.   Lianet does not smoke or use smokeless tobacco.   Weight: 160 lbs    MEDICATIONS: triamterene-hydrochlorothiazide oral, lisinopril oral, ALLERGIES: NKDA  Clinician Response:  Dear Lianet,   Based on your exposure to COVID-19 (coronavirus), we would like to test you for this virus.  1. Please call [phone] to schedule your visit. Explain that you were referred by [facility] to have a COVID-19 test. Be ready to share your [facility] visit ID number.  * If you need to schedule in [facility] please call [phone] or for [employer] employees please call [phone].   * If you need to schedule in the [place] area please call [phone]. [employer] employees call [phone].   The following will serve as your written order for this COVID Test, ordered by me, for the indication of suspected COVID [Z20.828]: The test will be ordered in [employer], our electronic health record, after you are scheduled. It will show as ordered and authorized by Neil Brand MD.  Order: COVID-19 (coronavirus) PCR for ASYMPTOMATIC EXPOSURE testing from [facility].   If you know you have had close contact with someone who tested positive, you should be quarantined for 14 days after this exposure. You should stay in quarantine for the14 days even if the covid test is negative, the optimal time to test after exposure is 5-7 days from the exposure  Quarantine means   What should I do?  For safety, it's very important to follow these rules. Do this for 14 days after the date you were last exposed to the virus..  Stay home and away  from others. Don't go to school or anywhere else. Generally quarantine means staying home from work but there are some exceptions to this. Please contact your workplace.   No hugging, kissing or shaking hands.  Don't let anyone visit.  Cover your mouth and nose with a mask, tissue or washcloth to avoid spreading germs.  Wash your hands and face often. Use soap and water.  What are the symptoms of COVID-19?  The most common symptoms are cough, fever and trouble breathing. Less common symptoms include headache, body aches, fatigue (feeling very tired), chills, sore throat, stuffy or runny nose, diarrhea (loose poop), loss of taste or smell, belly pain, and nausea or vomiting (feeling sick to your stomach or throwing up).  After 14 days, if you have still don't have symptoms, you likely don't have this virus.  If you develop symptoms, follow these guidelines.  If you're normally healthy: Please start another [facility] visit to report your symptoms. Go to [email or web address].  If you have a serious health problem (like cancer, heart failure, an organ transplant or kidney disease): Call your specialty clinic. Let them know that you might have COVID-19.  2. When it's time for your COVID test:  Stay at least 6 feet away from others. (If someone will drive you to your test, stay in the backseat, as far away from the  as you can.)  Cover your mouth and nose with a mask, tissue or washcloth.  Go straight to the testing site. Don't make any stops on the way there or back.  Please note  Caregivers in these groups are at risk for severe illness due to COVID-19:  o People 65 years and older  o People who live in a nursing home or long-term care facility  o People with chronic disease (lung, heart, cancer, diabetes, kidney, liver, immunologic)  o People who have a weakened immune system, including those who:  Are in cancer treatment  Take medicine that weakens the immune system, such as corticosteroids  Had a bone marrow or organ  transplant  Have an immune deficiency  Have poorly controlled HIV or AIDS  Are obese (body mass index of 40 or higher)  Smoke regularly  Where can I get more information?  [facility] -- About COVID-19: [email or web address]  CDC -- What to Do If You're Sick: www.cdc.gov/coronavirus/2019-ncov/about/steps-when-sick.html  CDC -- Ending Home Isolation: www.cdc.gov/coronavirus/2019-ncov/hcp/disposition-in-home-patients.html  CDC -- Caring for Someone: www.cdc.gov/coronavirus/2019-ncov/if-you-are-sick/care-for-someone.html  [facility] -- Interim Guidance for Hospital Discharge to Home: www.[facility].mn./diseases/coronavirus/hcp/hospdischarge.pdf  [facility] clinical trials (COVID-19 research studies): clinicalaffairs.[facility]-clinical-trials  Below are the COVID-19 hotlines at the [place] Department of [facility] ([facility]). Interpreters are available.  For health questions: Call 284-517-6971 or 1-628.625.8964 (7 a.m. to 7 p.m.)  For questions about schools and childcare: Call 961-306-1826 or 1-789.287.8299 (7 a.m. to 7 p.m.)    Diagnosis: Contact with and (suspected) exposure to other viral communicable diseases  Diagnosis ICD: Z20.828

## 2021-04-17 ENCOUNTER — HEALTH MAINTENANCE LETTER (OUTPATIENT)
Age: 76
End: 2021-04-17

## 2021-07-26 NOTE — TELEPHONE ENCOUNTER
Patient has glucometer true metrix and will need test strips and lancets for it   Reordered per patient request Tried to reach patient, left message for patient to call the clinic back.    Sharee Lanier, [facility]

## 2021-09-10 ENCOUNTER — MYC MEDICAL ADVICE (OUTPATIENT)
Dept: INTERNAL MEDICINE | Facility: CLINIC | Age: 76
End: 2021-09-10

## 2021-09-10 DIAGNOSIS — I10 ESSENTIAL HYPERTENSION: ICD-10-CM

## 2021-09-10 RX ORDER — TRIAMTERENE AND HYDROCHLOROTHIAZIDE 37.5; 25 MG/1; MG/1
CAPSULE ORAL
Qty: 90 CAPSULE | Refills: 3 | Status: SHIPPED | OUTPATIENT
Start: 2021-09-10 | End: 2022-09-27

## 2021-09-10 RX ORDER — LISINOPRIL 40 MG/1
40 TABLET ORAL DAILY
Qty: 90 TABLET | Refills: 3 | Status: SHIPPED | OUTPATIENT
Start: 2021-09-10 | End: 2022-09-27

## 2021-09-16 ENCOUNTER — TRANSFERRED RECORDS (OUTPATIENT)
Dept: HEALTH INFORMATION MANAGEMENT | Facility: CLINIC | Age: 76
End: 2021-09-16

## 2021-09-26 ENCOUNTER — HEALTH MAINTENANCE LETTER (OUTPATIENT)
Age: 76
End: 2021-09-26

## 2021-09-27 ENCOUNTER — OFFICE VISIT (OUTPATIENT)
Dept: INTERNAL MEDICINE | Facility: CLINIC | Age: 76
End: 2021-09-27
Payer: COMMERCIAL

## 2021-09-27 VITALS
BODY MASS INDEX: 34.86 KG/M2 | TEMPERATURE: 97.7 F | DIASTOLIC BLOOD PRESSURE: 84 MMHG | HEART RATE: 88 BPM | SYSTOLIC BLOOD PRESSURE: 128 MMHG | WEIGHT: 177 LBS | OXYGEN SATURATION: 100 % | RESPIRATION RATE: 18 BRPM

## 2021-09-27 DIAGNOSIS — Z12.31 ENCOUNTER FOR SCREENING MAMMOGRAM FOR BREAST CANCER: ICD-10-CM

## 2021-09-27 DIAGNOSIS — C43.9 MELANOMA OF SKIN (H): ICD-10-CM

## 2021-09-27 DIAGNOSIS — M25.551 HIP PAIN, RIGHT: ICD-10-CM

## 2021-09-27 DIAGNOSIS — I10 ESSENTIAL HYPERTENSION: Primary | ICD-10-CM

## 2021-09-27 LAB
ALBUMIN SERPL-MCNC: 4.1 G/DL (ref 3.4–5)
ALP SERPL-CCNC: 78 U/L (ref 40–150)
ALT SERPL W P-5'-P-CCNC: 18 U/L (ref 0–50)
ANION GAP SERPL CALCULATED.3IONS-SCNC: 4 MMOL/L (ref 3–14)
AST SERPL W P-5'-P-CCNC: 20 U/L (ref 0–45)
BILIRUB SERPL-MCNC: 0.4 MG/DL (ref 0.2–1.3)
BUN SERPL-MCNC: 35 MG/DL (ref 7–30)
CALCIUM SERPL-MCNC: 9.7 MG/DL (ref 8.5–10.1)
CHLORIDE BLD-SCNC: 104 MMOL/L (ref 94–109)
CHOLEST SERPL-MCNC: 235 MG/DL
CO2 SERPL-SCNC: 27 MMOL/L (ref 20–32)
CREAT SERPL-MCNC: 1.1 MG/DL (ref 0.52–1.04)
CREAT UR-MCNC: 56 MG/DL
FASTING STATUS PATIENT QL REPORTED: YES
GFR SERPL CREATININE-BSD FRML MDRD: 50 ML/MIN/1.73M2
GLUCOSE BLD-MCNC: 105 MG/DL (ref 70–99)
HDLC SERPL-MCNC: 74 MG/DL
LDLC SERPL CALC-MCNC: 129 MG/DL
MICROALBUMIN UR-MCNC: <5 MG/L
MICROALBUMIN/CREAT UR: NORMAL MG/G{CREAT}
NONHDLC SERPL-MCNC: 161 MG/DL
POTASSIUM BLD-SCNC: 4.3 MMOL/L (ref 3.4–5.3)
PROT SERPL-MCNC: 7.8 G/DL (ref 6.8–8.8)
SODIUM SERPL-SCNC: 135 MMOL/L (ref 133–144)
TRIGL SERPL-MCNC: 159 MG/DL

## 2021-09-27 PROCEDURE — 90662 IIV NO PRSV INCREASED AG IM: CPT | Performed by: INTERNAL MEDICINE

## 2021-09-27 PROCEDURE — G0008 ADMIN INFLUENZA VIRUS VAC: HCPCS | Performed by: INTERNAL MEDICINE

## 2021-09-27 PROCEDURE — 36415 COLL VENOUS BLD VENIPUNCTURE: CPT | Performed by: INTERNAL MEDICINE

## 2021-09-27 PROCEDURE — 82043 UR ALBUMIN QUANTITATIVE: CPT | Performed by: INTERNAL MEDICINE

## 2021-09-27 PROCEDURE — 80061 LIPID PANEL: CPT | Performed by: INTERNAL MEDICINE

## 2021-09-27 PROCEDURE — 80053 COMPREHEN METABOLIC PANEL: CPT | Performed by: INTERNAL MEDICINE

## 2021-09-27 PROCEDURE — 99214 OFFICE O/P EST MOD 30 MIN: CPT | Mod: 25 | Performed by: INTERNAL MEDICINE

## 2021-09-27 ASSESSMENT — PAIN SCALES - GENERAL: PAINLEVEL: MODERATE PAIN (4)

## 2021-09-27 NOTE — PROGRESS NOTES
Assessment & Plan     Essential hypertension  Patient not been seen for 2 years due to Covid had a virtual visit last year.  She is fine with her blood pressure at 128/84, will check her comprehensive panel, microalbumin and lipids.  Continue her 2 medications of lisinopril and Dyazide.  Recommended more exercise but she needs to get her right hip fixed to do this.  - Comprehensive metabolic panel (BMP + Alb, Alk Phos, ALT, AST, Total. Bili, TP); Future  - Albumin Random Urine Quantitative with Creat Ratio; Future  - Lipid panel reflex to direct LDL Fasting; Future    Hip pain, right  Right hip pain she will be seeing Dr. Leigh for hip replacement in December but this is delayed due to the COVID-19 pandemic and the hospitals being full.    Melanoma of skin (H)    A lot of the skin over her left eye is doing well she sees dermatology in October    Encounter for screening mammogram for breast cancer  Mammogram is needed as she has a previous right sided breast cancer with mastectomy and breast reconstruction.  - *MA Screening Digital Bilateral; Future      Return in about 1 year (around 9/27/2022) for Physical Exam.    Michael Zambrano MD  [facility]    Denisse Martini is a 75 year old who presents for the following health issues     HPI     Hypertension Follow-up      Do you check your blood pressure regularly outside of the clinic? Yes     Are you following a low salt diet? Yes    Are your blood pressures ever more than 140 on the top number (systolic) OR more   than 90 on the bottom number (diastolic), for example 140/90? No  BP is doing ok today. Two blood pressure medications.  No side effects.  Refills were done.      Doing ok, hasn't been seen for a year with covid, being safe.    No chest pains, no sob.    Right hip, knee pains, plan on hip replacement in December delayed due to Covid.      History of melanoma, sees dermatology in October, was on her eyebrow, doing well.      Past Medical History:   Diagnosis Date     Breast cancer (H) 1995     Essential hypertension      Melanoma (H) 2019     Current Outpatient Medications   Medication     lisinopril (ZESTRIL) 40 MG tablet     Melatonin 10 MG TABS tablet     triamterene-HCTZ (DYAZIDE) 37.5-25 MG capsule     No current facility-administered medications for this visit.     Social History     Tobacco Use     Smoking status: Never Smoker     Smokeless tobacco: Never Used   Substance Use Topics     Alcohol use: None     Drug use: None     Review of Systems       Objective    /84 (Cuff Size: Adult Large)   Pulse 88   Temp 97.7  F (36.5  C) (Temporal)   Resp 18   Wt 80.3 kg (177 lb)   SpO2 100%   BMI 34.86 kg/m    Body mass index is 34.86 kg/m .  Physical Exam   GENERAL: healthy, alert and no distress  RESP: lungs clear to auscultation - no rales, rhonchi or wheezes  CV: regular rate and rhythm, normal S1 S2, no S3 or S4, no murmur, click or rub, no peripheral edema and peripheral pulses strong  MS: no gross musculoskeletal defects noted, no edema  SKIN: no suspicious lesions or rashes  NEURO: Normal strength and tone, mentation intact and speech normal  PSYCH: mentation appears normal, affect normal/bright

## 2021-10-12 ENCOUNTER — HOSPITAL ENCOUNTER (OUTPATIENT)
Dept: MAMMOGRAPHY | Facility: CLINIC | Age: 76
Discharge: HOME | End: 2021-10-12
Attending: INTERNAL MEDICINE | Admitting: INTERNAL MEDICINE
Payer: COMMERCIAL

## 2021-10-12 DIAGNOSIS — Z12.31 ENCOUNTER FOR SCREENING MAMMOGRAM FOR BREAST CANCER: ICD-10-CM

## 2021-10-12 PROCEDURE — 77063 BREAST TOMOSYNTHESIS BI: CPT | Mod: 52

## 2021-12-03 ENCOUNTER — OFFICE VISIT (OUTPATIENT)
Dept: INTERNAL MEDICINE | Facility: CLINIC | Age: 76
End: 2021-12-03
Payer: COMMERCIAL

## 2021-12-03 VITALS
WEIGHT: 173 LBS | BODY MASS INDEX: 34.07 KG/M2 | SYSTOLIC BLOOD PRESSURE: 126 MMHG | DIASTOLIC BLOOD PRESSURE: 66 MMHG | HEART RATE: 100 BPM | OXYGEN SATURATION: 99 % | TEMPERATURE: 98 F | RESPIRATION RATE: 10 BRPM

## 2021-12-03 DIAGNOSIS — Z01.818 PREOP GENERAL PHYSICAL EXAM: Primary | ICD-10-CM

## 2021-12-03 DIAGNOSIS — M25.551 HIP PAIN, RIGHT: ICD-10-CM

## 2021-12-03 LAB
ANION GAP SERPL CALCULATED.3IONS-SCNC: 4 MMOL/L (ref 3–14)
BASOPHILS # BLD AUTO: 0 10E3/UL (ref 0–0.2)
BASOPHILS NFR BLD AUTO: 1 %
BUN SERPL-MCNC: 26 MG/DL (ref 7–30)
CALCIUM SERPL-MCNC: 9 MG/DL (ref 8.5–10.1)
CHLORIDE BLD-SCNC: 107 MMOL/L (ref 94–109)
CO2 SERPL-SCNC: 28 MMOL/L (ref 20–32)
CREAT SERPL-MCNC: 0.86 MG/DL (ref 0.52–1.04)
EOSINOPHIL # BLD AUTO: 0.6 10E3/UL (ref 0–0.7)
EOSINOPHIL NFR BLD AUTO: 9 %
ERYTHROCYTE [DISTWIDTH] IN BLOOD BY AUTOMATED COUNT: 14.6 % (ref 10–15)
GFR SERPL CREATININE-BSD FRML MDRD: 67 ML/MIN/1.73M2
GLUCOSE BLD-MCNC: 94 MG/DL (ref 70–99)
HCT VFR BLD AUTO: 35.9 % (ref 35–47)
HGB BLD-MCNC: 11.9 G/DL (ref 11.7–15.7)
IMM GRANULOCYTES # BLD: 0 10E3/UL
IMM GRANULOCYTES NFR BLD: 0 %
LYMPHOCYTES # BLD AUTO: 1.9 10E3/UL (ref 0.8–5.3)
LYMPHOCYTES NFR BLD AUTO: 31 %
MCH RBC QN AUTO: 28.5 PG (ref 26.5–33)
MCHC RBC AUTO-ENTMCNC: 33.1 G/DL (ref 31.5–36.5)
MCV RBC AUTO: 86 FL (ref 78–100)
MONOCYTES # BLD AUTO: 0.4 10E3/UL (ref 0–1.3)
MONOCYTES NFR BLD AUTO: 6 %
NEUTROPHILS # BLD AUTO: 3.2 10E3/UL (ref 1.6–8.3)
NEUTROPHILS NFR BLD AUTO: 53 %
NRBC # BLD AUTO: 0 10E3/UL
NRBC BLD AUTO-RTO: 0 /100
PLATELET # BLD AUTO: 195 10E3/UL (ref 150–450)
POTASSIUM BLD-SCNC: 4.1 MMOL/L (ref 3.4–5.3)
RBC # BLD AUTO: 4.17 10E6/UL (ref 3.8–5.2)
SODIUM SERPL-SCNC: 139 MMOL/L (ref 133–144)
WBC # BLD AUTO: 6.1 10E3/UL (ref 4–11)

## 2021-12-03 PROCEDURE — 36415 COLL VENOUS BLD VENIPUNCTURE: CPT | Performed by: INTERNAL MEDICINE

## 2021-12-03 PROCEDURE — 85025 COMPLETE CBC W/AUTO DIFF WBC: CPT | Performed by: INTERNAL MEDICINE

## 2021-12-03 PROCEDURE — 93000 ELECTROCARDIOGRAM COMPLETE: CPT | Performed by: INTERNAL MEDICINE

## 2021-12-03 PROCEDURE — 80048 BASIC METABOLIC PNL TOTAL CA: CPT | Performed by: INTERNAL MEDICINE

## 2021-12-03 PROCEDURE — 99214 OFFICE O/P EST MOD 30 MIN: CPT | Performed by: INTERNAL MEDICINE

## 2021-12-03 NOTE — PATIENT INSTRUCTIONS

## 2022-01-18 ENCOUNTER — E-VISIT (OUTPATIENT)
Dept: INTERNAL MEDICINE | Facility: CLINIC | Age: 77
End: 2022-01-18
Payer: COMMERCIAL

## 2022-01-18 DIAGNOSIS — J02.9 SORE THROAT: Primary | ICD-10-CM

## 2022-01-18 DIAGNOSIS — R09.81 NASAL CONGESTION: ICD-10-CM

## 2022-01-18 PROCEDURE — 99421 OL DIG E/M SVC 5-10 MIN: CPT | Performed by: INTERNAL MEDICINE

## 2022-01-19 ENCOUNTER — LAB (OUTPATIENT)
Dept: FAMILY MEDICINE | Facility: CLINIC | Age: 77
End: 2022-01-19
Attending: INTERNAL MEDICINE
Payer: COMMERCIAL

## 2022-01-19 DIAGNOSIS — R09.81 NASAL CONGESTION: ICD-10-CM

## 2022-01-19 DIAGNOSIS — J02.9 SORE THROAT: ICD-10-CM

## 2022-01-19 LAB — SARS-COV-2 RNA RESP QL NAA+PROBE: NORMAL

## 2022-01-19 PROCEDURE — U0003 INFECTIOUS AGENT DETECTION BY NUCLEIC ACID (DNA OR RNA); SEVERE ACUTE RESPIRATORY SYNDROME CORONAVIRUS 2 (SARS-COV-2) (CORONAVIRUS DISEASE [COVID-19]), AMPLIFIED PROBE TECHNIQUE, MAKING USE OF HIGH THROUGHPUT TECHNOLOGIES AS DESCRIBED BY CMS-2020-01-R: HCPCS | Mod: 90

## 2022-01-19 PROCEDURE — 99000 SPECIMEN HANDLING OFFICE-LAB: CPT

## 2022-01-19 PROCEDURE — U0005 INFEC AGEN DETEC AMPLI PROBE: HCPCS | Mod: 90

## 2022-01-20 LAB — SARS-COV-2 RNA RESP QL NAA+PROBE: NOT DETECTED

## 2022-05-08 ENCOUNTER — HEALTH MAINTENANCE LETTER (OUTPATIENT)
Age: 77
End: 2022-05-08

## 2022-09-27 ENCOUNTER — MYC MEDICAL ADVICE (OUTPATIENT)
Dept: INTERNAL MEDICINE | Facility: CLINIC | Age: 77
End: 2022-09-27

## 2022-09-27 DIAGNOSIS — I10 ESSENTIAL HYPERTENSION: ICD-10-CM

## 2022-09-27 RX ORDER — TRIAMTERENE AND HYDROCHLOROTHIAZIDE 37.5; 25 MG/1; MG/1
1 CAPSULE ORAL DAILY
Qty: 90 CAPSULE | Refills: 0 | Status: SHIPPED | OUTPATIENT
Start: 2022-09-27 | End: 2022-10-18

## 2022-09-27 RX ORDER — LISINOPRIL 40 MG/1
40 TABLET ORAL DAILY
Qty: 90 TABLET | Refills: 0 | Status: SHIPPED | OUTPATIENT
Start: 2022-09-27 | End: 2022-10-18

## 2022-10-18 ENCOUNTER — OFFICE VISIT (OUTPATIENT)
Dept: INTERNAL MEDICINE | Facility: CLINIC | Age: 77
End: 2022-10-18
Payer: COMMERCIAL

## 2022-10-18 VITALS
HEIGHT: 60 IN | OXYGEN SATURATION: 99 % | SYSTOLIC BLOOD PRESSURE: 130 MMHG | TEMPERATURE: 98.2 F | RESPIRATION RATE: 16 BRPM | BODY MASS INDEX: 34.95 KG/M2 | WEIGHT: 178 LBS | HEART RATE: 110 BPM | DIASTOLIC BLOOD PRESSURE: 80 MMHG

## 2022-10-18 DIAGNOSIS — J30.1 SEASONAL ALLERGIC RHINITIS DUE TO POLLEN: Primary | ICD-10-CM

## 2022-10-18 DIAGNOSIS — H61.23 BILATERAL IMPACTED CERUMEN: ICD-10-CM

## 2022-10-18 DIAGNOSIS — Z23 HIGH PRIORITY FOR 2019-NCOV VACCINE: ICD-10-CM

## 2022-10-18 DIAGNOSIS — Z23 NEED FOR PROPHYLACTIC VACCINATION AND INOCULATION AGAINST INFLUENZA: ICD-10-CM

## 2022-10-18 DIAGNOSIS — Z12.31 ENCOUNTER FOR SCREENING MAMMOGRAM FOR BREAST CANCER: ICD-10-CM

## 2022-10-18 DIAGNOSIS — C43.9 MELANOMA OF SKIN (H): ICD-10-CM

## 2022-10-18 DIAGNOSIS — I10 ESSENTIAL HYPERTENSION: ICD-10-CM

## 2022-10-18 LAB
ALBUMIN SERPL-MCNC: 4.3 G/DL (ref 3.4–5)
ALP SERPL-CCNC: 90 U/L (ref 40–150)
ALT SERPL W P-5'-P-CCNC: 17 U/L (ref 0–50)
ANION GAP SERPL CALCULATED.3IONS-SCNC: 4 MMOL/L (ref 3–14)
AST SERPL W P-5'-P-CCNC: 15 U/L (ref 0–45)
BILIRUB SERPL-MCNC: 0.4 MG/DL (ref 0.2–1.3)
BUN SERPL-MCNC: 36 MG/DL (ref 7–30)
CALCIUM SERPL-MCNC: 9.9 MG/DL (ref 8.5–10.1)
CHLORIDE BLD-SCNC: 104 MMOL/L (ref 94–109)
CHOLEST SERPL-MCNC: 230 MG/DL
CO2 SERPL-SCNC: 30 MMOL/L (ref 20–32)
CREAT SERPL-MCNC: 0.98 MG/DL (ref 0.52–1.04)
CREAT UR-MCNC: 66 MG/DL
FASTING STATUS PATIENT QL REPORTED: YES
GFR SERPL CREATININE-BSD FRML MDRD: 60 ML/MIN/1.73M2
GLUCOSE BLD-MCNC: 110 MG/DL (ref 70–99)
HDLC SERPL-MCNC: 73 MG/DL
LDLC SERPL CALC-MCNC: 118 MG/DL
MICROALBUMIN UR-MCNC: 6 MG/L
MICROALBUMIN/CREAT UR: 9.09 MG/G CR (ref 0–25)
NONHDLC SERPL-MCNC: 157 MG/DL
POTASSIUM BLD-SCNC: 4.2 MMOL/L (ref 3.4–5.3)
PROT SERPL-MCNC: 8.4 G/DL (ref 6.8–8.8)
SODIUM SERPL-SCNC: 138 MMOL/L (ref 133–144)
TRIGL SERPL-MCNC: 197 MG/DL

## 2022-10-18 PROCEDURE — 80053 COMPREHEN METABOLIC PANEL: CPT | Performed by: INTERNAL MEDICINE

## 2022-10-18 PROCEDURE — 99214 OFFICE O/P EST MOD 30 MIN: CPT | Mod: 25 | Performed by: INTERNAL MEDICINE

## 2022-10-18 PROCEDURE — 91312 COVID-19,PF,PFIZER BOOSTER BIVALENT: CPT | Performed by: INTERNAL MEDICINE

## 2022-10-18 PROCEDURE — 0124A COVID-19,PF,PFIZER BOOSTER BIVALENT: CPT | Performed by: INTERNAL MEDICINE

## 2022-10-18 PROCEDURE — 80061 LIPID PANEL: CPT | Performed by: INTERNAL MEDICINE

## 2022-10-18 PROCEDURE — 69210 REMOVE IMPACTED EAR WAX UNI: CPT | Mod: 50 | Performed by: INTERNAL MEDICINE

## 2022-10-18 PROCEDURE — G0008 ADMIN INFLUENZA VIRUS VAC: HCPCS | Mod: 59 | Performed by: INTERNAL MEDICINE

## 2022-10-18 PROCEDURE — 82043 UR ALBUMIN QUANTITATIVE: CPT | Performed by: INTERNAL MEDICINE

## 2022-10-18 PROCEDURE — 90662 IIV NO PRSV INCREASED AG IM: CPT | Performed by: INTERNAL MEDICINE

## 2022-10-18 PROCEDURE — 36415 COLL VENOUS BLD VENIPUNCTURE: CPT | Performed by: INTERNAL MEDICINE

## 2022-10-18 RX ORDER — MULTIVITAMIN WITH IRON
1 TABLET ORAL DAILY
COMMUNITY

## 2022-10-18 RX ORDER — FLUTICASONE PROPIONATE 50 MCG
1 SPRAY, SUSPENSION (ML) NASAL DAILY
Qty: 16 G | Refills: 3 | Status: SHIPPED | OUTPATIENT
Start: 2022-10-18 | End: 2023-07-28

## 2022-10-18 RX ORDER — LISINOPRIL 40 MG/1
40 TABLET ORAL DAILY
Qty: 90 TABLET | Refills: 3 | Status: SHIPPED | OUTPATIENT
Start: 2022-10-18 | End: 2023-07-28

## 2022-10-18 RX ORDER — TRIAMTERENE AND HYDROCHLOROTHIAZIDE 37.5; 25 MG/1; MG/1
1 CAPSULE ORAL DAILY
Qty: 90 CAPSULE | Refills: 3 | Status: SHIPPED | OUTPATIENT
Start: 2022-10-18 | End: 2023-07-28

## 2022-10-18 NOTE — PROGRESS NOTES
"  Assessment & Plan     Seasonal allergic rhinitis due to pollen  Seasonal allergies with some postnasal drip, patient has never been on an antihistamine or nasal spray.  She would rather do the nasal sprays and antihistamine I will prescribe Flonase for her.  - fluticasone (FLONASE) 50 MCG/ACT nasal spray; Spray 1 spray into both nostrils daily    Essential hypertension  Blood pressure which is high on the first check but better on recheck.  She is taking lisinopril along with triamterene-hydrochlorothiazide doing well she had some leg cramps and is better on oral magnesium.  We will check her electrolytes, microalbumin and lipid panel.  Continue to work on exercise, weight loss and salt reduction.  - lisinopril (ZESTRIL) 40 MG tablet; Take 1 tablet (40 mg) by mouth daily  - triamterene-HCTZ (DYAZIDE) 37.5-25 MG capsule; Take 1 capsule by mouth daily TAKE ONE CAPSULE BY MOUTH EVERY MORNING  - Lipid panel reflex to direct LDL Fasting; Future  - Comprehensive metabolic panel (BMP + Alb, Alk Phos, ALT, AST, Total. Bili, TP); Future  - Albumin Random Urine Quantitative with Creat Ratio; Future    Encounter for screening mammogram for breast cancer  Mammogram is ordered for her  - *MA Screening Digital Bilateral; Future    Melanoma of skin (H)  History of melanoma over her eyebrow she does follow with dermatology and will make that appointment this month.    Cerumen impaction bilateral ears  Cerumen was removed with a blue curette from both ears, a large amount from both ears, attempted by the medical assistant to flush the ears had little improvement.  I then removed again with a blue curette more cerumen which was hard and stringy from the left ear.  For the continued wax she will try Debrox drops and come back to the clinic for repeat flushing.           BMI:   Estimated body mass index is 35.05 kg/m  as calculated from the following:    Height as of this encounter: 1.518 m (4' 11.75\").    Weight as of this " "encounter: 80.7 kg (178 lb).           No follow-ups on file.    Michael Zambrano MD  [facility]    Denisse Martini is a 76 year old, presenting for the following health issues:  Hypertension and Sinus Problem    History of Present Illness       Reason for visit:  Blood pressure check  _med  also suspect sinus infecrion    She eats 2-3 servings of fruits and vegetables daily.She consumes 0 sweetened beverage(s) daily.She exercises with enough effort to increase her heart rate 20 to 29 minutes per day.  She exercises with enough effort to increase her heart rate 3 or less days per week.   She is taking medications regularly.     Sinus congestion started in September, wonders about allergies.  Will do the Flonase.     Bp check and taking her two medications of lisinopril and dyazide.  Occasionally checks bp at home.    Systolic at home can be 130 range, if high rechecks and comes down.  No side effects from medications.     Right hip replaced last year and doing well.     Melanoma of the skin on the eyebrow, follows with dermatology, doing ok.     Past Medical History:   Diagnosis Date     Breast cancer (H) 1995     Essential hypertension      Melanoma (H) 2019     Current Outpatient Medications   Medication     fluticasone (FLONASE) 50 MCG/ACT nasal spray     lisinopril (ZESTRIL) 40 MG tablet     magnesium 250 MG tablet     Melatonin 10 MG TABS tablet     triamterene-HCTZ (DYAZIDE) 37.5-25 MG capsule     No current facility-administered medications for this visit.     Social History     Tobacco Use     Smoking status: Never     Smokeless tobacco: Never         Review of Systems         Objective    BP (!) 146/84   Pulse 110   Temp 98.2  F (36.8  C) (Temporal)   Resp 16   Ht 1.518 m (4' 11.75\")   Wt 80.7 kg (178 lb)   SpO2 99%   BMI 35.05 kg/m    There is no height or weight on file to calculate BMI.  Physical Exam   GENERAL: healthy, alert and no distress  HENT: ears are full of " wax, dark hard wax removed from right ear with blue curette, rest of wax flushed out.   NECK: no adenopathy, no asymmetry, masses, or scars and thyroid normal to palpation  RESP: lungs clear to auscultation - no rales, rhonchi or wheezes  CV: regular rate and rhythm, normal S1 S2, no S3 or S4, no murmur, click or rub, no peripheral edema and peripheral pulses strong  MS: no gross musculoskeletal defects noted, no edema  SKIN: no suspicious lesions or rashes  NEURO: Normal strength and tone, mentation intact and speech normal  PSYCH: mentation appears normal, affect normal/bright

## 2022-10-18 NOTE — PROGRESS NOTES
Prior to immunization administration, verified patients identity using patient s name and date of birth. Please see Immunization Activity for additional information.     Screening Questionnaire for Adult Immunization    Are you sick today?   No   Do you have allergies to medications, food, a vaccine component or latex?   No   Have you ever had a serious reaction after receiving a vaccination?   No   Do you have a long-term health problem with heart, lung, kidney, or metabolic disease (e.g., diabetes), asthma, a blood disorder, no spleen, complement component deficiency, a cochlear implant, or a spinal fluid leak?  Are you on long-term aspirin therapy?   No   Do you have cancer, leukemia, HIV/AIDS, or any other immune system problem?   No   Do you have a parent, brother, or sister with an immune system problem?   No   In the past 3 months, have you taken medications that affect  your immune system, such as prednisone, other steroids, or anticancer drugs; drugs for the treatment of rheumatoid arthritis, Crohn s disease, or psoriasis; or have you had radiation treatments?   No   Have you had a seizure, or a brain or other nervous system problem?   No   During the past year, have you received a transfusion of blood or blood    products, or been given immune (gamma) globulin or antiviral drug?   No   For women: Are you pregnant or is there a chance you could become       pregnant during the next month?   No   Have you received any vaccinations in the past 4 weeks?   No     Immunization questionnaire answers were all negative.        Per orders of Dr. Michael Zambrano, injection of HD influenza and Pfizer bivalent given by Enma Lobo CMA. Patient instructed to remain in clinic for 15 minutes afterwards, and to report any adverse reaction to me immediately.       Screening performed by Enma Lobo CMA on 10/18/2022 at 2:28 PM.

## 2023-06-02 ENCOUNTER — HEALTH MAINTENANCE LETTER (OUTPATIENT)
Age: 78
End: 2023-06-02

## 2023-06-20 ENCOUNTER — HOSPITAL ENCOUNTER (OUTPATIENT)
Dept: MAMMOGRAPHY | Facility: CLINIC | Age: 78
Discharge: HOME | End: 2023-06-20
Attending: INTERNAL MEDICINE | Admitting: INTERNAL MEDICINE
Payer: COMMERCIAL

## 2023-06-20 DIAGNOSIS — Z12.31 VISIT FOR SCREENING MAMMOGRAM: ICD-10-CM

## 2023-06-20 PROCEDURE — 77067 SCR MAMMO BI INCL CAD: CPT | Mod: 52

## 2023-07-21 ASSESSMENT — ENCOUNTER SYMPTOMS
FREQUENCY: 0
WEAKNESS: 0
DIZZINESS: 0
MYALGIAS: 0
CHILLS: 0
SHORTNESS OF BREATH: 0
COUGH: 0
NERVOUS/ANXIOUS: 0
SORE THROAT: 0
CONSTIPATION: 0
HEARTBURN: 0
PARESTHESIAS: 0
NAUSEA: 0
JOINT SWELLING: 0
FEVER: 0
EYE PAIN: 0
ARTHRALGIAS: 0
PALPITATIONS: 0
ABDOMINAL PAIN: 0
HEMATOCHEZIA: 0
HEADACHES: 0
HEMATURIA: 0
DYSURIA: 0
BREAST MASS: 0
DIARRHEA: 0

## 2023-07-21 ASSESSMENT — ACTIVITIES OF DAILY LIVING (ADL): CURRENT_FUNCTION: NO ASSISTANCE NEEDED

## 2023-07-28 ENCOUNTER — OFFICE VISIT (OUTPATIENT)
Dept: INTERNAL MEDICINE | Facility: CLINIC | Age: 78
End: 2023-07-28
Payer: COMMERCIAL

## 2023-07-28 VITALS
SYSTOLIC BLOOD PRESSURE: 128 MMHG | RESPIRATION RATE: 16 BRPM | HEART RATE: 94 BPM | DIASTOLIC BLOOD PRESSURE: 76 MMHG | WEIGHT: 176 LBS | BODY MASS INDEX: 34.55 KG/M2 | OXYGEN SATURATION: 100 % | TEMPERATURE: 98 F | HEIGHT: 60 IN

## 2023-07-28 DIAGNOSIS — R01.1 SYSTOLIC MURMUR: ICD-10-CM

## 2023-07-28 DIAGNOSIS — J30.1 SEASONAL ALLERGIC RHINITIS DUE TO POLLEN: ICD-10-CM

## 2023-07-28 DIAGNOSIS — I10 ESSENTIAL HYPERTENSION: ICD-10-CM

## 2023-07-28 DIAGNOSIS — Z78.0 POST-MENOPAUSAL: ICD-10-CM

## 2023-07-28 DIAGNOSIS — Z00.00 ENCOUNTER FOR MEDICARE ANNUAL WELLNESS EXAM: Primary | ICD-10-CM

## 2023-07-28 PROBLEM — C43.9 MELANOMA OF SKIN (H): Status: RESOLVED | Noted: 2019-08-12 | Resolved: 2023-07-28

## 2023-07-28 LAB
ALBUMIN SERPL BCG-MCNC: 4.4 G/DL (ref 3.5–5.2)
ALP SERPL-CCNC: 81 U/L (ref 35–104)
ALT SERPL W P-5'-P-CCNC: 9 U/L (ref 0–50)
ANION GAP SERPL CALCULATED.3IONS-SCNC: 9 MMOL/L (ref 7–15)
AST SERPL W P-5'-P-CCNC: 17 U/L (ref 0–45)
BILIRUB SERPL-MCNC: 0.3 MG/DL
BUN SERPL-MCNC: 42.3 MG/DL (ref 8–23)
CALCIUM SERPL-MCNC: 9.6 MG/DL (ref 8.8–10.2)
CHLORIDE SERPL-SCNC: 102 MMOL/L (ref 98–107)
CHOLEST SERPL-MCNC: 216 MG/DL
CREAT SERPL-MCNC: 1.06 MG/DL (ref 0.51–0.95)
CREAT UR-MCNC: 43.7 MG/DL
DEPRECATED HCO3 PLAS-SCNC: 26 MMOL/L (ref 22–29)
GFR SERPL CREATININE-BSD FRML MDRD: 55 ML/MIN/1.73M2
GLUCOSE SERPL-MCNC: 106 MG/DL (ref 70–99)
HDLC SERPL-MCNC: 66 MG/DL
LDLC SERPL CALC-MCNC: 119 MG/DL
MICROALBUMIN UR-MCNC: <12 MG/L
MICROALBUMIN/CREAT UR: NORMAL MG/G{CREAT}
NONHDLC SERPL-MCNC: 150 MG/DL
POTASSIUM SERPL-SCNC: 4.5 MMOL/L (ref 3.4–5.3)
PROT SERPL-MCNC: 7.3 G/DL (ref 6.4–8.3)
SODIUM SERPL-SCNC: 137 MMOL/L (ref 136–145)
TRIGL SERPL-MCNC: 153 MG/DL

## 2023-07-28 PROCEDURE — 82043 UR ALBUMIN QUANTITATIVE: CPT | Performed by: INTERNAL MEDICINE

## 2023-07-28 PROCEDURE — 99214 OFFICE O/P EST MOD 30 MIN: CPT | Mod: 25 | Performed by: INTERNAL MEDICINE

## 2023-07-28 PROCEDURE — 82570 ASSAY OF URINE CREATININE: CPT | Performed by: INTERNAL MEDICINE

## 2023-07-28 PROCEDURE — 80061 LIPID PANEL: CPT | Performed by: INTERNAL MEDICINE

## 2023-07-28 PROCEDURE — 80053 COMPREHEN METABOLIC PANEL: CPT | Performed by: INTERNAL MEDICINE

## 2023-07-28 PROCEDURE — G0009 ADMIN PNEUMOCOCCAL VACCINE: HCPCS | Performed by: INTERNAL MEDICINE

## 2023-07-28 PROCEDURE — 36415 COLL VENOUS BLD VENIPUNCTURE: CPT | Performed by: INTERNAL MEDICINE

## 2023-07-28 PROCEDURE — 90677 PCV20 VACCINE IM: CPT | Performed by: INTERNAL MEDICINE

## 2023-07-28 PROCEDURE — G0439 PPPS, SUBSEQ VISIT: HCPCS | Performed by: INTERNAL MEDICINE

## 2023-07-28 RX ORDER — LISINOPRIL 40 MG/1
40 TABLET ORAL DAILY
Qty: 90 TABLET | Refills: 3 | Status: SHIPPED | OUTPATIENT
Start: 2023-07-28

## 2023-07-28 RX ORDER — FLUTICASONE PROPIONATE 50 MCG
1 SPRAY, SUSPENSION (ML) NASAL DAILY
Qty: 16 G | Refills: 6 | Status: SHIPPED | OUTPATIENT
Start: 2023-07-28

## 2023-07-28 RX ORDER — TRIAMTERENE AND HYDROCHLOROTHIAZIDE 37.5; 25 MG/1; MG/1
1 CAPSULE ORAL DAILY
Qty: 90 CAPSULE | Refills: 3 | Status: SHIPPED | OUTPATIENT
Start: 2023-07-28

## 2023-07-28 ASSESSMENT — ENCOUNTER SYMPTOMS
HEARTBURN: 0
DIZZINESS: 0
SORE THROAT: 0
NAUSEA: 0
FEVER: 0
NERVOUS/ANXIOUS: 0
HEMATOCHEZIA: 0
ABDOMINAL PAIN: 0
DIARRHEA: 0
MYALGIAS: 0
SHORTNESS OF BREATH: 0
JOINT SWELLING: 0
BREAST MASS: 0
WEAKNESS: 0
FREQUENCY: 0
HEADACHES: 0
PALPITATIONS: 0
DYSURIA: 0
EYE PAIN: 0
CONSTIPATION: 0
PARESTHESIAS: 0
ARTHRALGIAS: 0
HEMATURIA: 0
COUGH: 0
CHILLS: 0

## 2023-07-28 ASSESSMENT — ACTIVITIES OF DAILY LIVING (ADL): CURRENT_FUNCTION: NO ASSISTANCE NEEDED

## 2023-07-28 NOTE — PROGRESS NOTES
"SUBJECTIVE:   Lianet is a 77 year old who presents for Preventive Visit.      7/28/2023     7:40 AM   Additional Questions   Roomed by Enma Lobo     Are you in the first 12 months of your Medicare coverage?  No    Healthy Habits:     In general, how would you rate your overall health?  Good    Frequency of exercise:  1 day/week    Duration of exercise:  15-30 minutes    Do you usually eat at least 4 servings of fruit and vegetables a day, include whole grains    & fiber and avoid regularly eating high fat or \"junk\" foods?  Yes    Taking medications regularly:  No    Barriers to taking medications:  None    Medication side effects:  Not applicable    Ability to successfully perform activities of daily living:  No assistance needed    Home Safety:  No safety concerns identified    Hearing Impairment:  No hearing concerns    In the past 6 months, have you been bothered by leaking of urine? Yes    In general, how would you rate your overall mental or emotional health?  Excellent    Additional concerns today:  No      Basal cell cancer on her back, dermatology will remove.  They continue to watch her for melanoma.      BP is good, ok on two medications.      Tried nasal spray and still has allergy         Have you ever done Advance Care Planning? (For example, a Health Directive, POLST, or a discussion with a medical provider or your loved ones about your wishes): No, advance care planning information given to patient to review.  Advanced care planning was discussed at today's visit.    Fall risk  Fallen 2 or more times in the past year?: No  Any fall with injury in the past year?: No    Cognitive Screening   1) Repeat 3 items (Leader, Season, Table)    2) Clock draw: NORMAL  3) 3 item recall: Recalls 3 objects  Results: 3 items recalled: COGNITIVE IMPAIRMENT LESS LIKELY    Mini-CogTM Copyright S [employer]. Licensed by the author for use in [facility]; reprinted with permission (jaden@.[facility]). All " rights reserved.      Do you have sleep apnea, excessive snoring or daytime drowsiness? : no    Reviewed and updated as needed this visit by clinical staff   Tobacco  Allergies  Meds              Reviewed and updated as needed this visit by Provider                 Social History     Tobacco Use    Smoking status: Never    Smokeless tobacco: Never   Substance Use Topics    Alcohol use: Not on file         7/21/2023     9:09 AM   Alcohol Use   Prescreen: >3 drinks/day or >7 drinks/week? No     Do you have a current opioid prescription? No  Do you use any other controlled substances or medications that are not prescribed by a provider? None    Current providers sharing in care for this patient include:   Patient Care Team:  Michael Zambrano MD as PCP - General (Internal Medicine)  Michael Zambrano MD as Assigned PCP    The following health maintenance items are reviewed in Epic and correct as of today:  Health Maintenance   Topic Date Due    DEXA  Never done    ANNUAL REVIEW OF HM ORDERS  Never done    ADVANCE CARE PLANNING  Never done    HEPATITIS C SCREENING  Never done    DTAP/TDAP/TD IMMUNIZATION (1 - Tdap) Never done    ZOSTER IMMUNIZATION (2 of 3) 03/10/2010    MEDICARE ANNUAL WELLNESS VISIT  11/17/2012    Pneumococcal Vaccine: 65+ Years (1 - PCV) Never done    COVID-19 Vaccine (5 - Pfizer series) 02/18/2023    INFLUENZA VACCINE (1) 09/01/2023    FALL RISK ASSESSMENT  07/28/2024    COLORECTAL CANCER SCREENING  08/12/2024    MAMMO SCREENING  06/20/2025    LIPID  10/18/2027    PHQ-2 (once per calendar year)  Completed    IPV IMMUNIZATION  Aged Out    MENINGITIS IMMUNIZATION  Aged Out     Lab work is in process  Pneumonia 20 today.     Mammogram Screening - Patient over age 75, has elected to continue with screening.  Pertinent mammograms are reviewed under the imaging tab.    Review of Systems   Constitutional:  Negative for chills and fever.   HENT:  Positive for congestion. Negative for ear pain, hearing loss  "and sore throat.    Eyes:  Negative for pain and visual disturbance.   Respiratory:  Negative for cough and shortness of breath.    Cardiovascular:  Negative for chest pain, palpitations and peripheral edema.   Gastrointestinal:  Negative for abdominal pain, constipation, diarrhea, heartburn, hematochezia and nausea.   Breasts:  Negative for tenderness, breast mass and discharge.   Genitourinary:  Negative for dysuria, frequency, genital sores, hematuria, pelvic pain, urgency, vaginal bleeding and vaginal discharge.   Musculoskeletal:  Negative for arthralgias, joint swelling and myalgias.   Skin:  Negative for rash.   Neurological:  Negative for dizziness, weakness, headaches and paresthesias.   Psychiatric/Behavioral:  Negative for mood changes. The patient is not nervous/anxious.      OBJECTIVE:   /76   Pulse 94   Temp 98  F (36.7  C) (Temporal)   Resp 16   Ht 1.511 m (4' 11.5\")   Wt 79.8 kg (176 lb)   SpO2 100%   BMI 34.95 kg/m   Estimated body mass index is 34.95 kg/m  as calculated from the following:    Height as of this encounter: 1.511 m (4' 11.5\").    Weight as of this encounter: 79.8 kg (176 lb).  Physical Exam  GENERAL: healthy, alert and no distress  EYES: Eyes grossly normal to inspection, PERRL and conjunctivae and sclerae normal  HENT: ear canals and TM's normal, nose and mouth without ulcers or lesions  NECK: no adenopathy, no asymmetry, masses, or scars and thyroid normal to palpation  RESP: lungs clear to auscultation - no rales, rhonchi or wheezes  BREAST: right breast implant is riding high above the breast chronically   CV: regular rate and rhythm, normal S1 S2, no S3 or S4,click or rub, no peripheral edema and peripheral pulses strong. Soft systolic murmur at the base   ABDOMEN: soft, nontender, no hepatosplenomegaly, no masses and bowel sounds normal  MS: no gross musculoskeletal defects noted, no edema  SKIN: no suspicious lesions or rashes  NEURO: Normal strength and tone, " "mentation intact and speech normal  PSYCH: mentation appears normal, affect normal/bright        ASSESSMENT / PLAN:       ICD-10-CM    1. Encounter for Medicare annual wellness exam  Z00.00       2. Seasonal allergic rhinitis due to pollen  J30.1 fluticasone (FLONASE) 50 MCG/ACT nasal spray      3. Essential hypertension  I10 lisinopril (ZESTRIL) 40 MG tablet     triamterene-HCTZ (DYAZIDE) 37.5-25 MG capsule     Comprehensive metabolic panel (BMP + Alb, Alk Phos, ALT, AST, Total. Bili, TP)     Lipid panel reflex to direct LDL Fasting     Albumin Random Urine Quantitative with Creat Ratio     OFFICE/OUTPT VISIT,EST,LEVL III      4. Post-menopausal  Z78.0 DEXA HIP/PELVIS/SPINE - Future     OFFICE/OUTPT VISIT,EST,LEVL III      5. Systolic murmur  R01.1 Echocardiogram Complete          Medicare wellness exam, doing well.  We will update her pneumonia shot, recommended tetanus and shingles at the pharmacy.  Mammograms up-to-date    We will get a DEXA scan    Colonoscopy is due next year.    Other issues addressed  Hypertension we will continue her medications check labs today.  New systolic murmur we will get an echocardiogram.  Assume this is quite benign but we will check it out and follow as needed.  Seasonal allergies we will continue to use Flonase which helped her some this fall.      Patient has been advised of split billing requirements and indicates understanding: Yes      COUNSELING:  Reviewed preventive health counseling, as reflected in patient instructions       Regular exercise       Healthy diet/nutrition       Immunizations  Vaccinated for: Pneumococcal        BMI:   Estimated body mass index is 34.95 kg/m  as calculated from the following:    Height as of this encounter: 1.511 m (4' 11.5\").    Weight as of this encounter: 79.8 kg (176 lb).   Weight management plan: Discussed healthy diet and exercise guidelines      She reports that she has never smoked. She has never used smokeless " tobacco.      Appropriate preventive services were discussed with this patient, including applicable screening as appropriate for cardiovascular disease, diabetes, osteopenia/osteoporosis, and glaucoma.  As appropriate for age/gender, discussed screening for colorectal cancer, prostate cancer, breast cancer, and cervical cancer. Checklist reviewing preventive services available has been given to the patient.    Reviewed patients plan of care and provided an AVS. The Basic Care Plan (routine screening as documented in Health Maintenance) for Lianet meets the Care Plan requirement. This Care Plan has been established and reviewed with the Patient.          Michael Zambrano MD  [facility]    Identified Health Risks:  I have reviewed Opioid Use Disorder and Substance Use Disorder risk factors and made any needed referrals. She is at risk for lack of exercise and has been provided with information to increase physical activity for the benefit of her well-being.  Information on urinary incontinence and treatment options given to patient.

## 2023-07-28 NOTE — PROGRESS NOTES
Prior to immunization administration, verified patients identity using patient s name and date of birth. Please see Immunization Activity for additional information.     Screening Questionnaire for Adult Immunization    Are you sick today?   No   Do you have allergies to medications, food, a vaccine component or latex?   No   Have you ever had a serious reaction after receiving a vaccination?   No   Do you have a long-term health problem with heart, lung, kidney, or metabolic disease (e.g., diabetes), asthma, a blood disorder, no spleen, complement component deficiency, a cochlear implant, or a spinal fluid leak?  Are you on long-term aspirin therapy?   No   Do you have cancer, leukemia, HIV/AIDS, or any other immune system problem?   No   Do you have a parent, brother, or sister with an immune system problem?   No   In the past 3 months, have you taken medications that affect  your immune system, such as prednisone, other steroids, or anticancer drugs; drugs for the treatment of rheumatoid arthritis, Crohn s disease, or psoriasis; or have you had radiation treatments?   No   Have you had a seizure, or a brain or other nervous system problem?   No   During the past year, have you received a transfusion of blood or blood    products, or been given immune (gamma) globulin or antiviral drug?   No   For women: Are you pregnant or is there a chance you could become       pregnant during the next month?   No   Have you received any vaccinations in the past 4 weeks?   No     Immunization questionnaire answers were all negative.      Patient instructed to remain in clinic for 15 minutes afterwards, and to report any adverse reactions.     Screening performed by Enma Lobo CMA on 7/28/2023 at 8:51 AM.

## 2023-07-28 NOTE — PATIENT INSTRUCTIONS
"Patient Education   Personalized Prevention Plan  You are due for the preventive services outlined below.  Your care team is available to assist you in scheduling these services.  If you have already completed any of these items, please share that information with your care team to update in your medical record.  Health Maintenance Due   Topic Date Due     Osteoporosis Screening  Never done     Hepatitis C Screening  Never done     Diptheria Tetanus Pertussis (DTAP/TDAP/TD) Vaccine (1 - Tdap) Never done     Zoster (Shingles) Vaccine (2 of 3) 03/10/2010     Pneumococcal Vaccine (1 - PCV) Never done     COVID-19 Vaccine (5 - Pfizer series) 02/18/2023     Learning About Being Physically Active  What is physical activity?     Being physically active means doing any kind of activity that gets your body moving.  The types of physical activity that can help you get fit and stay healthy include:  Aerobic or \"cardio\" activities. These make your heart beat faster and make you breathe harder, such as brisk walking, riding a bike, or running. They strengthen your heart and lungs and build up your endurance.  Strength training activities. These make your muscles work against, or \"resist,\" something. Examples include lifting weights or doing push-ups. These activities help tone and strengthen your muscles and bones.  Stretches. These let you move your joints and muscles through their full range of motion. Stretching helps you be more flexible.  Reaching a balance between these three types of physical activity is important because each one contributes to your overall fitness.  What are the benefits of being active?  Being active is one of the best things you can do for your health. It helps you to:  Feel stronger and have more energy to do all the things you like to do.  Focus better at school or work.  Feel, think, and sleep better.  Reach and stay at a healthy weight.  Lose fat and build lean muscle.  Lower your risk for serious " "health problems, including diabetes, heart attack, high blood pressure, and some cancers.  Keep your heart, lungs, bones, muscles, and joints strong and healthy.  How can you make being active part of your life?  Start slowly. Make it your long-term goal to get at least 30 minutes of exercise on most days of the week. Walking is a good choice. You also may want to do other activities, such as running, swimming, cycling, or playing tennis or team sports.  Pick activities that you like--ones that make your heart beat faster, your muscles stronger, and your muscles and joints more flexible. If you find more than one thing you like doing, do them all. You don't have to do the same thing every day.  Get your heart pumping every day. Any activity that makes your heart beat faster and keeps it at that rate for a while counts.  Here are some great ways to get your heart beating faster:  Go for a brisk walk, run, or bike ride.  Go for a hike or swim.  Go in-line skating.  Play a game of touch football, basketball, or soccer.  Ride a bike.  Play tennis or racquetball.  Climb stairs.  Even some household chores can be aerobic--just do them at a faster pace. Vacuuming, raking or mowing the lawn, sweeping the garage, and washing and waxing the car all can help get your heart rate up.  Strengthen your muscles during the week. You don't have to lift heavy weights or grow big, bulky muscles to get stronger. Doing a few simple activities that make your muscles work against, or \"resist,\" something can help you get stronger.  For example, you can:  Do push-ups or sit-ups, which use your own body weight as resistance.  Lift weights or dumbbells or use stretch bands at home or in a gym or community center.  Stretch your muscles often. Stretching will help you as you become more active. It can help you stay flexible, loosen tight muscles, and avoid injury. It can also help improve your balance and posture and can be a great way to " "relax.  Be sure to stretch the muscles you'll be using when you work out. It's best to warm your muscles slightly before you stretch them. Walk or do some other light aerobic activity for a few minutes, and then start stretching.  When you stretch your muscles:  Do it slowly. Stretching is not about going fast or making sudden movements.  Don't push or bounce during a stretch.  Hold each stretch for at least 15 to 30 seconds, if you can. You should feel a stretch in the muscle, but not pain.  Breathe out as you do the stretch. Then breathe in as you hold the stretch. Don't hold your breath.  If you're worried about how more activity might affect your health, have a checkup before you start. Follow any special advice your doctor gives you for getting a smart start.  Where can you learn more?  Go to https://www.TurtleCell.Maginatics/patiented  Enter W332 in the search box to learn more about \"Learning About Being Physically Active.\"  Current as of: October 10, 2022               Content Version: 13.7    1137-5003 [employer].   Care instructions adapted under license by your healthcare professional. If you have questions about a medical condition or this instruction, always ask your healthcare professional. [employer] disclaims any warranty or liability for your use of this information.      Bladder Training: Care Instructions  Your Care Instructions     Bladder training is used to treat urge incontinence and stress incontinence. Urge incontinence means that the need to urinate comes on so fast that you can't get to a toilet in time. Stress incontinence means that you leak urine because of pressure on your bladder. For example, it may happen when you laugh, cough, or lift something heavy.  Bladder training can increase how long you can wait before you have to urinate. It can also help your bladder hold more urine. And it can give you better control over the urge to urinate.  It is important to " remember that bladder training takes a few weeks to a few months to make a difference. You may not see results right away, but don't give up.  Follow-up care is a key part of your treatment and safety. Be sure to make and go to all appointments, and call your doctor if you are having problems. It's also a good idea to know your test results and keep a list of the medicines you take.  How can you care for yourself at home?  Work with your doctor to come up with a bladder training program that is right for you. You may use one or more of the following methods.  Delayed urination  In the beginning, try to keep from urinating for 5 minutes after you first feel the need to go.  While you wait, take deep, slow breaths to relax. Kegel exercises can also help you delay the need to go to the bathroom.  After some practice, when you can easily wait 5 minutes to urinate, try to wait 10 minutes before you urinate.  Slowly increase the waiting period until you are able to control when you have to urinate.  Scheduled urination  Empty your bladder when you first wake up in the morning.  Schedule times throughout the day when you will urinate.  Start by going to the bathroom every hour, even if you don't need to go.  Slowly increase the time between trips to the bathroom.  When you have found a schedule that works well for you, keep doing it.  If you wake up during the night and have to urinate, do it. Apply your schedule to waking hours only.  Kegel exercises  These tighten and strengthen pelvic muscles, which can help you control the flow of urine. (If doing these exercises causes pain, stop doing them and talk with your doctor.) To do Kegel exercises:  Squeeze your muscles as if you were trying not to pass gas. Or squeeze your muscles as if you were stopping the flow of urine. Your belly, legs, and buttocks shouldn't move.  Hold the squeeze for 3 seconds, then relax for 5 to 10 seconds.  Start with 3 seconds, then add 1 second  "each week until you are able to squeeze for 10 seconds.  Repeat the exercise 10 times a session. Do 3 to 8 sessions a day.  When should you call for help?  Watch closely for changes in your health, and be sure to contact your doctor if:    Your incontinence is getting worse.     You do not get better as expected.   Where can you learn more?  Go to https://www.threadsy.net/patiented  Enter V684 in the search box to learn more about \"Bladder Training: Care Instructions.\"  Current as of: March 1, 2023               Content Version: 13.7    9575-6580 [employer].   Care instructions adapted under license by your healthcare professional. If you have questions about a medical condition or this instruction, always ask your healthcare professional. [employer] disclaims any warranty or liability for your use of this information.         "

## 2023-09-08 ENCOUNTER — HOSPITAL ENCOUNTER (OUTPATIENT)
Dept: CARDIOLOGY | Facility: CLINIC | Age: 78
Discharge: HOME | End: 2023-09-08
Attending: INTERNAL MEDICINE | Admitting: INTERNAL MEDICINE
Payer: COMMERCIAL

## 2023-09-08 DIAGNOSIS — R01.1 SYSTOLIC MURMUR: ICD-10-CM

## 2023-09-08 LAB — LVEF ECHO: NORMAL

## 2023-09-08 PROCEDURE — 93306 TTE W/DOPPLER COMPLETE: CPT

## 2023-09-08 PROCEDURE — 93306 TTE W/DOPPLER COMPLETE: CPT | Mod: 26 | Performed by: INTERNAL MEDICINE

## 2023-12-12 NOTE — PROCEDURES
Lianet Mendez is a 73 year old patient who comes in today for a Blood Pressure check.  Initial BP:  /72 (BP Location: Left arm, Patient Position: Sitting, Cuff Size: Adult Large)   Pulse 100      100  Disposition: follow-up as previously indicated by provider    Sharee Lanier CMA      
with patient

## 2024-05-21 ENCOUNTER — PATIENT OUTREACH (OUTPATIENT)
Dept: CARE COORDINATION | Facility: CLINIC | Age: 79
End: 2024-05-21
Payer: COMMERCIAL

## 2024-06-18 ENCOUNTER — PATIENT OUTREACH (OUTPATIENT)
Dept: CARE COORDINATION | Facility: CLINIC | Age: 79
End: 2024-06-18
Payer: COMMERCIAL

## 2024-06-28 ENCOUNTER — PATIENT OUTREACH (OUTPATIENT)
Dept: CARE COORDINATION | Facility: CLINIC | Age: 79
End: 2024-06-28
Payer: COMMERCIAL

## 2024-07-12 ENCOUNTER — PATIENT OUTREACH (OUTPATIENT)
Dept: CARE COORDINATION | Facility: CLINIC | Age: 79
End: 2024-07-12
Payer: COMMERCIAL

## 2024-09-08 ENCOUNTER — HEALTH MAINTENANCE LETTER (OUTPATIENT)
Age: 79
End: 2024-09-08

## 2024-10-25 ENCOUNTER — MYC REFILL (OUTPATIENT)
Dept: INTERNAL MEDICINE | Facility: CLINIC | Age: 79
End: 2024-10-25
Payer: COMMERCIAL

## 2024-10-25 DIAGNOSIS — I10 ESSENTIAL HYPERTENSION: ICD-10-CM

## 2024-10-25 RX ORDER — LISINOPRIL 40 MG/1
40 TABLET ORAL DAILY
Qty: 90 TABLET | Refills: 3 | Status: SHIPPED | OUTPATIENT
Start: 2024-10-25

## 2024-10-25 RX ORDER — TRIAMTERENE AND HYDROCHLOROTHIAZIDE 37.5; 25 MG/1; MG/1
1 CAPSULE ORAL DAILY
Qty: 90 CAPSULE | Refills: 3 | Status: SHIPPED | OUTPATIENT
Start: 2024-10-25

## 2024-11-18 ENCOUNTER — OFFICE VISIT (OUTPATIENT)
Dept: INTERNAL MEDICINE | Facility: CLINIC | Age: 79
End: 2024-11-18
Payer: COMMERCIAL

## 2024-11-18 ENCOUNTER — HOSPITAL ENCOUNTER (OUTPATIENT)
Dept: GENERAL RADIOLOGY | Facility: CLINIC | Age: 79
Discharge: HOME | End: 2024-11-18
Attending: INTERNAL MEDICINE | Admitting: INTERNAL MEDICINE
Payer: COMMERCIAL

## 2024-11-18 VITALS
OXYGEN SATURATION: 100 % | SYSTOLIC BLOOD PRESSURE: 126 MMHG | HEIGHT: 60 IN | TEMPERATURE: 98.5 F | RESPIRATION RATE: 16 BRPM | BODY MASS INDEX: 33.36 KG/M2 | HEART RATE: 108 BPM | WEIGHT: 169.9 LBS | DIASTOLIC BLOOD PRESSURE: 78 MMHG

## 2024-11-18 DIAGNOSIS — R09.82 POST-NASAL DRIP: ICD-10-CM

## 2024-11-18 DIAGNOSIS — Z12.31 VISIT FOR SCREENING MAMMOGRAM: ICD-10-CM

## 2024-11-18 DIAGNOSIS — G25.2 RESTING TREMOR: ICD-10-CM

## 2024-11-18 DIAGNOSIS — Z86.0100 HISTORY OF COLONIC POLYPS: ICD-10-CM

## 2024-11-18 DIAGNOSIS — E78.5 HYPERLIPIDEMIA LDL GOAL <100: ICD-10-CM

## 2024-11-18 DIAGNOSIS — Z00.00 ENCOUNTER FOR MEDICARE ANNUAL WELLNESS EXAM: Primary | ICD-10-CM

## 2024-11-18 DIAGNOSIS — R05.3 CHRONIC COUGH: ICD-10-CM

## 2024-11-18 DIAGNOSIS — I10 ESSENTIAL HYPERTENSION: ICD-10-CM

## 2024-11-18 LAB
ALBUMIN SERPL BCG-MCNC: 4.7 G/DL (ref 3.5–5.2)
ALP SERPL-CCNC: 89 U/L (ref 40–150)
ALT SERPL W P-5'-P-CCNC: 10 U/L (ref 0–50)
ANION GAP SERPL CALCULATED.3IONS-SCNC: 12 MMOL/L (ref 7–15)
AST SERPL W P-5'-P-CCNC: 16 U/L (ref 0–45)
BILIRUB SERPL-MCNC: 0.4 MG/DL
BUN SERPL-MCNC: 38.6 MG/DL (ref 8–23)
CALCIUM SERPL-MCNC: 9.8 MG/DL (ref 8.8–10.4)
CHLORIDE SERPL-SCNC: 102 MMOL/L (ref 98–107)
CHOLEST SERPL-MCNC: 225 MG/DL
CREAT SERPL-MCNC: 0.96 MG/DL (ref 0.51–0.95)
CREAT UR-MCNC: 30.7 MG/DL
EGFRCR SERPLBLD CKD-EPI 2021: 61 ML/MIN/1.73M2
FASTING STATUS PATIENT QL REPORTED: YES
FASTING STATUS PATIENT QL REPORTED: YES
GLUCOSE SERPL-MCNC: 108 MG/DL (ref 70–99)
HCO3 SERPL-SCNC: 24 MMOL/L (ref 22–29)
HDLC SERPL-MCNC: 81 MG/DL
LDLC SERPL CALC-MCNC: 123 MG/DL
MICROALBUMIN UR-MCNC: <12 MG/L
MICROALBUMIN/CREAT UR: NORMAL MG/G{CREAT}
NONHDLC SERPL-MCNC: 144 MG/DL
POTASSIUM SERPL-SCNC: 4.4 MMOL/L (ref 3.4–5.3)
PROT SERPL-MCNC: 7.8 G/DL (ref 6.4–8.3)
SODIUM SERPL-SCNC: 138 MMOL/L (ref 135–145)
TRIGL SERPL-MCNC: 107 MG/DL

## 2024-11-18 PROCEDURE — G0439 PPPS, SUBSEQ VISIT: HCPCS | Performed by: INTERNAL MEDICINE

## 2024-11-18 PROCEDURE — 82043 UR ALBUMIN QUANTITATIVE: CPT | Performed by: INTERNAL MEDICINE

## 2024-11-18 PROCEDURE — 82570 ASSAY OF URINE CREATININE: CPT | Performed by: INTERNAL MEDICINE

## 2024-11-18 PROCEDURE — 36415 COLL VENOUS BLD VENIPUNCTURE: CPT | Performed by: INTERNAL MEDICINE

## 2024-11-18 PROCEDURE — 80061 LIPID PANEL: CPT | Performed by: INTERNAL MEDICINE

## 2024-11-18 PROCEDURE — 71046 X-RAY EXAM CHEST 2 VIEWS: CPT

## 2024-11-18 PROCEDURE — 80053 COMPREHEN METABOLIC PANEL: CPT | Performed by: INTERNAL MEDICINE

## 2024-11-18 PROCEDURE — 99214 OFFICE O/P EST MOD 30 MIN: CPT | Mod: 25 | Performed by: INTERNAL MEDICINE

## 2024-11-18 RX ORDER — CETIRIZINE HYDROCHLORIDE 10 MG/1
10 TABLET ORAL DAILY
Qty: 90 TABLET | Refills: 3 | Status: SHIPPED | OUTPATIENT
Start: 2024-11-18

## 2024-11-18 RX ORDER — ATORVASTATIN CALCIUM 20 MG/1
20 TABLET, FILM COATED ORAL DAILY
Qty: 90 TABLET | Refills: 3 | Status: SHIPPED | OUTPATIENT
Start: 2024-11-18

## 2024-11-18 RX ORDER — LOSARTAN POTASSIUM 100 MG/1
100 TABLET ORAL DAILY
Qty: 90 TABLET | Refills: 3 | Status: SHIPPED | OUTPATIENT
Start: 2024-11-18

## 2024-11-18 SDOH — HEALTH STABILITY: PHYSICAL HEALTH: ON AVERAGE, HOW MANY MINUTES DO YOU ENGAGE IN EXERCISE AT THIS LEVEL?: 10 MIN

## 2024-11-18 SDOH — HEALTH STABILITY: PHYSICAL HEALTH: ON AVERAGE, HOW MANY DAYS PER WEEK DO YOU ENGAGE IN MODERATE TO STRENUOUS EXERCISE (LIKE A BRISK WALK)?: 1 DAY

## 2024-11-18 ASSESSMENT — PAIN SCALES - GENERAL: PAINLEVEL_OUTOF10: NO PAIN (0)

## 2024-11-18 ASSESSMENT — SOCIAL DETERMINANTS OF HEALTH (SDOH): HOW OFTEN DO YOU GET TOGETHER WITH FRIENDS OR RELATIVES?: PATIENT DECLINED

## 2024-11-18 NOTE — PROGRESS NOTES
Preventive Care Visit  [facility]  Michael Zambrano MD, Internal Medicine  Nov 18, 2024      Assessment & Plan   Problem List Items Addressed This Visit       History of colonic polyps    Relevant Orders    Adult GI  Referral - Procedure Only     Other Visit Diagnoses       Encounter for Medicare annual wellness exam    -  Primary    Visit for screening mammogram        Essential hypertension        Relevant Medications    losartan (COZAAR) 100 MG tablet    Other Relevant Orders    Comprehensive metabolic panel (BMP + Alb, Alk Phos, ALT, AST, Total. Bili, TP)    Lipid panel reflex to direct LDL Fasting    Albumin Random Urine Quantitative with Creat Ratio    Post-nasal drip        Relevant Medications    cetirizine (ZYRTEC) 10 MG tablet    Chronic cough        Relevant Medications    cetirizine (ZYRTEC) 10 MG tablet    losartan (COZAAR) 100 MG tablet    Other Relevant Orders    XR Chest 2 Views    Resting tremor        Relevant Orders    Adult Neurology  Referral    Hyperlipidemia LDL goal <100        Relevant Medications    atorvastatin (LIPITOR) 20 MG tablet           Patient is here for Medicare wellness exam.  She has been up and in for a year and a half.  She lives with her son and his family with lots of animals there.  Will order a colonoscopy with a history of colon polyps.    No longer wanted do mammograms she has had a last 1 in 2021.  She does not want any immunizations today.    Other issues addressed hypertension she is on lisinopril and triamterene, with her cough has been chronic I will switch her lisinopril to losartan.    Postnasal drip and a cough we will try her on some Zyrtec since she is around so many animals she tried Flonase but did not think that helped.    She is concerned about her heart and cholesterol she has no chest pain no shortness of breath with exertion she had an echocardiogram in 2023 with mild aortic stenosis.  We will put her on  "statin therapy due to her risk and elevated cholesterol.    Resting tremor on the right hand on does not have other parkinsonian symptoms but will refer to neurology to see if there is anything else that can be done.  I did offer her the medication of primidone but she will wait.            Patient has been advised of split billing requirements and indicates understanding: Yes       BMI  Estimated body mass index is 33.58 kg/m  as calculated from the following:    Height as of this encounter: 1.515 m (4' 11.65\").    Weight as of this encounter: 77.1 kg (169 lb 14.4 oz).   Weight management plan: Discussed healthy diet and exercise guidelines    Counseling  Appropriate preventive services were addressed with this patient via screening, questionnaire, or discussion as appropriate for fall prevention, nutrition, physical activity, Tobacco-use cessation, social engagement, weight loss and cognition.  Checklist reviewing preventive services available has been given to the patient.  Reviewed patient's diet, addressing concerns and/or questions.   She is at risk for lack of exercise and has been provided with information to increase physical activity for the benefit of her well-being.   Information on urinary incontinence and treatment options given to patient.     FUTURE APPOINTMENTS:       - Follow-up for annual visit or as needed      Subjective   Lianet is a 79 year old, presenting for the following:  Wellness Visit        11/18/2024    12:34 PM   Additional Questions   Roomed by Nik       HPI    Has a few questions[place] Lives with her son and family.     Sinus is full and drainage, tried a nasal spray but no help. No allergy pills.      Family wants a few referrals to different doctors.      Has a cough as well, irritating, She is on lisinopril, She thinks it is asthmatic like cough, no smoking.      Weight is down 7 pounds, doesn't exercise, doesn't like it.     Right hand tremor resting, she can " still sew.  No dizziness, no double vision.  Tremor with nerves,     Health Care Directive  Patient does not have a Health Care Directive: Discussed advance care planning with patient; however, patient declined at this time.      11/18/2024   General Health   How would you rate your overall physical health? Good   Feel stress (tense, anxious, or unable to sleep) Not at all            11/18/2024   Nutrition   Diet: Regular (no restrictions)            11/18/2024   Exercise   Days per week of moderate/strenous exercise 1 day   Average minutes spent exercising at this level 10 min      (!) EXERCISE CONCERN      11/18/2024   Social Factors   Frequency of gathering with friends or relatives Patient declined   Worry food won't last until get money to buy more No   Food not last or not have enough money for food? No   Do you have housing? (Housing is defined as stable permanent housing and does not include staying ouside in a car, in a tent, in an abandoned building, in an overnight shelter, or couch-surfing.) Yes   Are you worried about losing your housing? No   Lack of transportation? No   Unable to get utilities (heat,electricity)? No            11/18/2024   Fall Risk   Fallen 2 or more times in the past year? No     No    Trouble with walking or balance? No     No        Patient-reported    Multiple values from one day are sorted in reverse-chronological order          11/18/2024   Activities of Daily Living- Home Safety   Needs help with the following daily activites None of the above   Safety concerns in the home None of the above            11/18/2024   Dental   Dentist two times every year? Yes            11/18/2024   Hearing Screening   Hearing concerns? None of the above            11/18/2024   Driving Risk Screening   Patient/family members have concerns about driving No            11/18/2024   General Alertness/Fatigue Screening   Have you been more tired than usual lately? No            11/18/2024   Urinary  Incontinence Screening   Bothered by leaking urine in past 6 months Yes            11/18/2024   TB Screening   Were you born outside of the [place]? No      Today's PHQ-2 Score:       11/18/2024    10:36 AM   PHQ-2 ( 1999 Pfizer)   Q1: Little interest or pleasure in doing things 0    Q2: Feeling down, depressed or hopeless 0    PHQ-2 Score 0    Q1: Little interest or pleasure in doing things Not at all   Q2: Feeling down, depressed or hopeless Not at all   PHQ-2 Score 0       Patient-reported         11/18/2024   Substance Use   Alcohol more than 3/day or more than 7/wk No   Do you have a current opioid prescription? No   How severe/bad is pain from 1 to 10? 0/10 (No Pain)   Do you use any other substances recreationally? No        Social History     Tobacco Use    Smoking status: Never    Smokeless tobacco: Never   Substance Use Topics    Alcohol use: Yes     Comment: Social    Drug use: Never           10/12/2021   LAST FHS-7 RESULTS   1st degree relative breast or ovarian cancer No   Any relative bilateral breast cancer No   Any male have breast cancer No   Any ONE woman have BOTH breast AND ovarian cancer No   Any woman with breast cancer before 50yrs No   2 or more relatives with breast AND/OR ovarian cancer No   2 or more relatives with breast AND/OR bowel cancer No      Mammogram Screening - After age 74- determine frequency with patient based on health status, life expectancy and patient goals    ASCVD Risk   The 10-year ASCVD risk score (Valorie HUGHES, et al., 2019) is: 25.4%    Values used to calculate the score:      Age: 79 years      Sex: Female      Is Non- : No      Diabetic: No      Tobacco smoker: No      Systolic Blood Pressure: 126 mmHg      Is BP treated: Yes      HDL Cholesterol: 66 mg/dL      Total Cholesterol: 216 mg/dL            Reviewed and updated as needed this visit by Provider                    Lab work is in process  Current providers sharing in care for this  patient include:  Patient Care Team:  Michael Zambrano MD as PCP - General (Internal Medicine)  Michael Zambrano MD as Assigned PCP    The following health maintenance items are reviewed in Epic and correct as of today:  Health Maintenance   Topic Date Due    DEXA  Never done    HEPATITIS C SCREENING  Never done    DTAP/TDAP/TD IMMUNIZATION (1 - Tdap) Never done    ZOSTER IMMUNIZATION (2 of 3) 03/10/2010    RSV VACCINE (1 - 1-dose 75+ series) Never done    MAMMO SCREENING  06/20/2024    MEDICARE ANNUAL WELLNESS VISIT  07/28/2024    BMP  07/28/2024    ANNUAL REVIEW OF HM ORDERS  07/28/2024    FALL RISK ASSESSMENT  11/18/2025    GLUCOSE  07/28/2026    LIPID  07/28/2028    ADVANCE CARE PLANNING  07/28/2028    PHQ-2 (once per calendar year)  Completed    INFLUENZA VACCINE  Completed    Pneumococcal Vaccine: 65+ Years  Completed    COVID-19 Vaccine  Completed    HPV IMMUNIZATION  Aged Out    MENINGITIS IMMUNIZATION  Aged Out    RSV MONOCLONAL ANTIBODY  Aged Out    COLORECTAL CANCER SCREENING  Discontinued         Review of Systems  CONSTITUTIONAL: NEGATIVE for fever, chills, change in weight  INTEGUMENTARY/SKIN: NEGATIVE for worrisome rashes, moles or lesions  EYES: NEGATIVE for vision changes or irritation  ENT/MOUTH: sinus congestion, around lots of animals.   RESP: NEGATIVE for significant cough or SOB  BREAST: NEGATIVE for masses, tenderness or discharge  CV: NEGATIVE for chest pain, palpitations or peripheral edema  GI: NEGATIVE for nausea, abdominal pain, heartburn, or change in bowel habits  : NEGATIVE for frequency, dysuria, or hematuria  MUSCULOSKELETAL: NEGATIVE for significant arthralgias or myalgia  NEURO: resting tremor in the right hand   ENDOCRINE: NEGATIVE for temperature intolerance, skin/hair changes  HEME: NEGATIVE for bleeding problems  PSYCHIATRIC: NEGATIVE for changes in mood or affect     Objective    Exam  /78 (BP Location: Left arm, Patient Position: Sitting, Cuff Size: Adult Regular)    "Pulse 108   Temp 98.5  F (36.9  C) (Temporal)   Resp 16   Ht 1.515 m (4' 11.65\")   Wt 77.1 kg (169 lb 14.4 oz)   SpO2 100%   BMI 33.58 kg/m     Estimated body mass index is 33.58 kg/m  as calculated from the following:    Height as of this encounter: 1.515 m (4' 11.65\").    Weight as of this encounter: 77.1 kg (169 lb 14.4 oz).    Physical Exam  GENERAL: alert and no distress  EYES: Eyes grossly normal to inspection, PERRL and conjunctivae and sclerae normal  HENT: ear canals and TM's normal, nose and mouth without ulcers or lesions  NECK: no adenopathy, no asymmetry, masses, or scars  RESP: lungs clear to auscultation - no rales, rhonchi or wheezes  CV: regular rate and rhythm, normal S1 S2, no S3 or S4, no murmur, click or rub, no peripheral edema  ABDOMEN: soft, nontender, no hepatosplenomegaly, no masses and bowel sounds normal  MS: no gross musculoskeletal defects noted, no edema  SKIN: no suspicious lesions or rashes  NEURO: resting tremor, no cogwheeling, normal gait   PSYCH: mentation appears normal, affect normal/bright        11/18/2024   Mini Cog   Clock Draw Score 2 Normal   3 Item Recall 3 objects recalled   Mini Cog Total Score 5                 Signed Electronically by: Michael Zambrano MD    "

## 2024-12-04 NOTE — TELEPHONE ENCOUNTER
RECORDS RECEIVED FROM: internal   REASON FOR VISIT: Resting tremors,    PROVIDER:Brooklyn Perez NP   DATE OF APPT: 1/10/25   NOTES (FOR ALL VISITS) STATUS DETAILS   OFFICE NOTE from referring provider Internal Dr Michael Zambrano @ [facility]:  11/18/24   MEDICATION LIST Internal    IMAGING  (FOR ALL VISITS)     MRI (HEAD, NECK, SPINE) N/A    CT (HEAD, NECK, SPINE) N/A

## 2024-12-09 DIAGNOSIS — R05.9 COUGH: Primary | ICD-10-CM

## 2024-12-17 ENCOUNTER — PATIENT OUTREACH (OUTPATIENT)
Dept: CARE COORDINATION | Facility: CLINIC | Age: 79
End: 2024-12-17
Payer: COMMERCIAL

## 2024-12-19 ENCOUNTER — OFFICE VISIT (OUTPATIENT)
Dept: FAMILY MEDICINE | Facility: CLINIC | Age: 79
End: 2024-12-19
Payer: COMMERCIAL

## 2024-12-19 ENCOUNTER — NURSE TRIAGE (OUTPATIENT)
Dept: INTERNAL MEDICINE | Facility: CLINIC | Age: 79
End: 2024-12-19

## 2024-12-19 VITALS
HEART RATE: 90 BPM | RESPIRATION RATE: 14 BRPM | WEIGHT: 169.3 LBS | DIASTOLIC BLOOD PRESSURE: 72 MMHG | BODY MASS INDEX: 33.46 KG/M2 | SYSTOLIC BLOOD PRESSURE: 124 MMHG | OXYGEN SATURATION: 96 % | TEMPERATURE: 97.4 F

## 2024-12-19 DIAGNOSIS — J01.90 ACUTE SINUSITIS WITH SYMPTOMS > 10 DAYS: Primary | ICD-10-CM

## 2024-12-19 ASSESSMENT — PAIN SCALES - GENERAL: PAINLEVEL_OUTOF10: NO PAIN (0)

## 2024-12-19 ASSESSMENT — ENCOUNTER SYMPTOMS: COUGH: 1

## 2024-12-19 NOTE — PROGRESS NOTES
Assessment & Plan     Acute sinusitis with symptoms > 10 days  Lianet Mendez is a 79-year-old female patient of Dr.Paul VIANEY Zambrano who presents to clinic today with persistent nasal symptoms and cough.  She was seen by Dr. Zambarno 1 month ago for her Medicare annual wellness visit.  At that time she had a lot of nasal congestion and was started on Zyrtec.  Previously she had been on Flonase which she did not believe helped.  Since that time she has had persistent nasal drainage sometimes purulent and cough sometimes productive of sputum.  She denies any fever.  She lives with her son and daughter in law who have suggested she is doing more wheezing.  She did have a chest x-ray performed a month ago which was felt to be clear she does have a right breast implant which was noted on the chest x-ray.    On exam today she is a pleasant elderly female no acute distress.  Blood pressure is 124/72.  Pulse is 90 and regular.  She is afebrile.  Respiratory rate is 14 and unlabored with oxygen saturations of 96% on room air.    HEENT exam: Normocephalic atraumatic.  Pupils are equally round reactive light combination.  TMs are obstructed by cerumen bilaterally.  Nasal passages are edematous with clear to cloudy rhinitis.  She has mild frontal sinus tenderness, no maxillary sinus tenderness.  Oropharynx is moist with no erythema.  She does have some streaky postnasal drainage noted.  Neck is supple without adenopathy.  Lungs show scattered rhonchi which clear with cough.  There is no expiratory wheezing heard.  Cardiac exam is regular.    Assessment: 79-year-old female with chronic allergic or seasonal rhinitis with persistent symptoms and now with streaky cloudy postnasal drainage, frontal sinus tenderness and cough productive of yellowish sputum.    Plan: Continue Zyrtec.  Start Augmentin 875 twice daily for 10 days.  If no improvement in 2 weeks follow-up in clinic for reevaluation.  - amoxicillin-clavulanate (AUGMENTIN)  875-125 MG tablet; Take 1 tablet by mouth 2 times daily for 10 days.            FUTURE APPOINTMENTS:       - Follow-up visit in 2 weeks if not improving.    Subjective   Lianet is a 79 year old, presenting for the following health issues:  Cough (Pt was put on allergy pill from , an x ray was done that day too /Still having sinus congestion and fullness in ears, still has cough)      12/19/2024    11:03 AM   Additional Questions   Roomed by Mary     Via the [employer], the patient has reported the following services have been completed -Mammogram: [facility] 2023-07-16, this information has not been sent to the abstraction team.  Cough    History of Present Illness       Reason for visit:  Cough wheezing sinus congestion  Symptom onset:  3-4 weeks ago  Symptoms include:  Cough. Sinus congestion  Symptom intensity:  Moderate  Symptom progression:  Worsening  Had these symptoms before:  Yes  Has tried/received treatment for these symptoms:  Yes  Previous treatment was successful:  No  What makes it worse:  No  What makes it better:  No   She is taking medications regularly.         Patient Active Problem List   Diagnosis    History of colonic polyps     Current Outpatient Medications   Medication Sig Dispense Refill    atorvastatin (LIPITOR) 20 MG tablet Take 1 tablet (20 mg) by mouth daily. 90 tablet 3    cetirizine (ZYRTEC) 10 MG tablet Take 1 tablet (10 mg) by mouth daily. 90 tablet 3    losartan (COZAAR) 100 MG tablet Take 1 tablet (100 mg) by mouth daily. 90 tablet 3    magnesium 250 MG tablet Take 1 tablet by mouth daily      Melatonin 10 MG TABS tablet Take 10 mg by mouth nightly as needed for sleep      triamterene-HCTZ (DYAZIDE) 37.5-25 MG capsule Take 1 capsule by mouth daily. TAKE ONE CAPSULE BY MOUTH EVERY MORNING 90 capsule 3             Review of Systems  CONSTITUTIONAL: NEGATIVE for fever, chills, change in weight  ENT/MOUTH: POSITIVE for nasal congestion, postnasal drainage,  rhinorrhea-purulent, and sinus pressure and NEGATIVE for fever and sore throat  RESP:POSITIVE for cough-productive and sputum yellow and NEGATIVE for hemoptysis, Hx asthma, Hx COPD, pleurisy, and SOB/dyspnea  CV: NEGATIVE for chest pain, palpitations or peripheral edema  ROS otherwise negative      Objective    /72   Pulse 90   Temp 97.4  F (36.3  C) (Temporal)   Resp 14   Wt 76.8 kg (169 lb 4.8 oz)   SpO2 96%   BMI 33.46 kg/m    Body mass index is 33.46 kg/m .  Physical Exam   GENERAL: alert and no distress  HENT: normal cephalic/atraumatic, both ears: occluded with wax, nose and mouth without ulcers or lesions, nasal mucosa edematous , rhinorrhea clear and creamy, oropharynx clear, oral mucous membranes moist, and sinuses: frontal tenderness on bilaterally  NECK: no adenopathy, no asymmetry, masses, or scars  RESP: no rales , no wheezes, and rhonchi scattered  CV: regular rate and rhythm, normal S1 S2, no S3 or S4, no murmur, click or rub, no peripheral edema  ABDOMEN: soft, nontender, no hepatosplenomegaly, no masses and bowel sounds normal  MS: no gross musculoskeletal defects noted, no edema            Signed Electronically by: Carlos Goel MD

## 2024-12-19 NOTE — TELEPHONE ENCOUNTER
Nurse Triage SBAR    Is this a 2nd Level Triage? YES, LICENSED PRACTITIONER REVIEW IS REQUIRED    Situation: Patient calling states she has had a cough for about a month. Was seen in November with PCP and had XR at that time. Patient states cough now is more productive and she is coughing up thick clear phlegm. Patient denies difficulty breathing. Does endorse wheezing. Denies fevers, CP. Patient states she feels like her cough is worse than it was in November and is wanting to be seen.     Background: Does have new pt appointment with pulmonology in February    Assessment: A/O. Wheezing cannot be heard to this writer over phone.    Protocol Recommended Disposition:   Go To Office Now    Recommendation: go to office now, no appointments available. Scheduled appointment for 11:20am. Reviewed red flag symptoms. Patient should go to UC or ER if red flag symptoms present before appointment. Patient agrees with plan.     Meghann Asif RN on 12/19/2024 at 7:38 AM      Reason for Disposition   Wheezing is present    Additional Information   Negative: Bluish (or gray) lips or face   Negative: SEVERE difficulty breathing (e.g., struggling for each breath, speaks in single words)   Negative: Rapid onset of cough and has hives   Negative: Coughing started suddenly after medicine, an allergic food or bee sting   Negative: Difficulty breathing after exposure to flames, smoke, or fumes   Negative: Sounds like a life-threatening emergency to the triager   Negative: Previous asthma attacks and this feels like asthma attack   Negative: Dry cough (non-productive; no sputum or minimal clear sputum) and within 14 days of COVID-19 Exposure   Negative: MODERATE difficulty breathing (e.g., speaks in phrases, SOB even at rest, pulse 100-120) and still present when not coughing   Negative: Chest pain present when not coughing   Negative: Passed out (e.g., fainted, lost consciousness, blacked out and was not responding)   Negative:  "Patient sounds very sick or weak to the triager   Negative: Increasing ankle swelling   Negative: Fever > 103 F (39.4 C)   Negative: Fever > 101 F (38.3 C) and over 60 years of age   Negative: Fever > 100 F (37.8 C) and has diabetes mellitus or a weak immune system (e.g., HIV positive, cancer chemotherapy, organ transplant, splenectomy, chronic steroids)   Negative: Fever > 100 F (37.8 C) and bedridden (e.g., CVA, chronic illness, recovering from surgery)   Negative: MILD difficulty breathing (e.g., minimal/no SOB at rest, SOB with walking, pulse <100) and still present when not coughing   Negative: Coughed up > 1 tablespoon (15 ml) blood (Exception: Blood-tinged sputum.)    Answer Assessment - Initial Assessment Questions  1. ONSET: \"When did the cough begin?\"       November  2. SEVERITY: \"How bad is the cough today?\"       moderate  3. SPUTUM: \"Describe the color of your sputum\" (e.g., none, dry cough; clear, white, yellow, green)      Productive, clear, thick  4. HEMOPTYSIS: \"Are you coughing up any blood?\" If Yes, ask: \"How much?\" (e.g., flecks, streaks, tablespoons, etc.)      no  5. DIFFICULTY BREATHING: \"Are you having difficulty breathing?\" If Yes, ask: \"How bad is it?\" (e.g., mild, moderate, severe)       no  6. FEVER: \"Do you have a fever?\" If Yes, ask: \"What is your temperature, how was it measured, and when did it start?\"      no  7. CARDIAC HISTORY: \"Do you have any history of heart disease?\" (e.g., heart attack, congestive heart failure)       no  8. LUNG HISTORY: \"Do you have any history of lung disease?\"  (e.g., pulmonary embolus, asthma, emphysema)      no  9. PE RISK FACTORS: \"Do you have a history of blood clots?\" (or: recent major surgery, recent prolonged travel, bedridden)      no  10. OTHER SYMPTOMS: \"Do you have any other symptoms?\" (e.g., runny nose, wheezing, chest pain)        Wheezing, chest congestion  11. PREGNANCY: \"Is there any chance you are pregnant?\" \"When was your last menstrual " "period?\"        no  12. TRAVEL: \"Have you traveled out of the country in the last month?\" (e.g., travel history, exposures)        no    Protocols used: Cough-A-[place]    "

## 2025-01-08 NOTE — CONFIDENTIAL NOTE
RECORDS RECEIVED FROM: internal    DATE RECEIVED: 2.17.25    NOTES STATUS DETAILS   OFFICE NOTE from referring provider internal  Self referred    OFFICE NOTE from other specialist internal  11.18.24 Zambrano      MEDICATION LIST internal     IMAGING  (NEED IMAGES AND REPORTS)     CHEST XRAY (CXR) internal  11.18.24    TESTS     PULMONARY FUNCTION TESTING (PFT) internal  Scheduled 2.17.25

## 2025-01-09 ENCOUNTER — TELEPHONE (OUTPATIENT)
Dept: NEUROLOGY | Facility: CLINIC | Age: 80
End: 2025-01-09
Payer: COMMERCIAL

## 2025-01-09 NOTE — TELEPHONE ENCOUNTER
Left Voicemail (1st Attempt) and Sent Mychart (1st Attempt) for the patient to call back and schedule the following:    Appointment type: New Movement Disorder  Provider: Brooklyn Perez  Return date: next available  Specialty phone number: [phone]  Additional appointment(s) needed: NA  Additonal Notes: R/S 1/10 appt as provider out of office    Marla Rodriguez on 1/9/2025 at 4:16 PM

## 2025-01-10 ENCOUNTER — PRE VISIT (OUTPATIENT)
Dept: NEUROLOGY | Facility: CLINIC | Age: 80
End: 2025-01-10

## 2025-01-13 ENCOUNTER — OFFICE VISIT (OUTPATIENT)
Dept: NEUROLOGY | Facility: CLINIC | Age: 80
End: 2025-01-13
Attending: INTERNAL MEDICINE
Payer: COMMERCIAL

## 2025-01-13 VITALS
SYSTOLIC BLOOD PRESSURE: 131 MMHG | DIASTOLIC BLOOD PRESSURE: 87 MMHG | HEART RATE: 95 BPM | WEIGHT: 170.6 LBS | BODY MASS INDEX: 33.71 KG/M2

## 2025-01-13 DIAGNOSIS — G20.C PRIMARY PARKINSONISM (H): Primary | ICD-10-CM

## 2025-01-13 DIAGNOSIS — G25.2 RESTING TREMOR: ICD-10-CM

## 2025-01-13 PROCEDURE — 99205 OFFICE O/P NEW HI 60 MIN: CPT | Performed by: PSYCHIATRY & NEUROLOGY

## 2025-01-13 PROCEDURE — G2211 COMPLEX E/M VISIT ADD ON: HCPCS | Performed by: PSYCHIATRY & NEUROLOGY

## 2025-01-13 RX ORDER — CARBIDOPA AND LEVODOPA 25; 100 MG/1; MG/1
1 TABLET ORAL 3 TIMES DAILY
Qty: 90 TABLET | Refills: 4 | Status: SHIPPED | OUTPATIENT
Start: 2025-01-13

## 2025-01-13 NOTE — PROGRESS NOTES
INITIAL NEUROLOGY CONSULTATION    DATE OF VISIT: 1/13/2025  MRN: [id number]  PATIENT NAME: Lianet Mendez  YOB: 1945    REFERRING PROVIDER: Michael Zambrano    Chief Complaint   Patient presents with    Tremors     Symptoms started in Spring of 2024; R hand. Consistent tremors; especially when patient is using her hands do things around the house or just resting. Anxiety and stress triggers as well.      SUBJECTIVE:                                                      HPI:   Lianet Mendez is a 79 year old female whom I have been asked by Dr. Zambrano to see in consultation for resting tremor. Per chart review, there have been a coupe of appointments scheduled with the Movement Disorders clinic (one 2 days ago), and these have been cancelled.     No other reported parkinsonian symptoms per primary care exam. Primidone was offered, but declined in 11.2024. She was then referred on to Neurology.    Additional history of HTN, HLD and chronic cough.    Lianet is here with her son and daughter-in-law today.  They live with her.  I ask about the tremor and Lianet says this has been going on since last spring, family agrees.  Gradual worsening.  Affects the right hand only which is her dominant hand.  That being said fine motor movements such as sewing and stitching are okay.   She cannot tell if there has been any other change in mobility. Some balance problems due to sinuses.    DIL says she shuffles, is hunched over, at times. Sleep is restful, she does not move around a lot in her sleep. No changes in taste or smell.  Appetite is fine.  Recall is fine, maybe processing speed is a little slower than before but no concerns about memory in general.   Voice has gotten quieter per family.    Family does have some concerns about deconditioning, lack of activity  so they brought up their stationary bike from the basement for Lianet to use.  Daughter-in-law also asks about strength in her right hand and  if there is something that can be done for this.  Lianet does not necessarily notice any weakness.    No known family history of neurologic disease, no strokes or neurodegenerative processes as far as Lianet is aware.      Past Medical History:   Diagnosis Date    Breast cancer (H) 1995    Essential hypertension     Melanoma (H) 2019     Past Surgical History:   Procedure Laterality Date    BIOPSY  2019    BREAST SURGERY      COLECTOMY      polyps.    COLONOSCOPY N/A 08/12/2019    Procedure: COLONOSCOPY;  Surgeon: Mario Alberto Villanueva MD;  Location:  GI    masectomy      ORTHOPEDIC SURGERY  2020    Hip replacement       Current Outpatient Medications   Medication Sig Dispense Refill    atorvastatin (LIPITOR) 20 MG tablet Take 1 tablet (20 mg) by mouth daily. 90 tablet 3    carbidopa-levodopa (SINEMET)  MG tablet Take 1 tablet by mouth 3 times daily. 90 tablet 4    cetirizine (ZYRTEC) 10 MG tablet Take 1 tablet (10 mg) by mouth daily. 90 tablet 3    losartan (COZAAR) 100 MG tablet Take 1 tablet (100 mg) by mouth daily. 90 tablet 3    magnesium 250 MG tablet Take 1 tablet by mouth daily      Melatonin 10 MG TABS tablet Take 10 mg by mouth nightly as needed for sleep      triamterene-HCTZ (DYAZIDE) 37.5-25 MG capsule Take 1 capsule by mouth daily. TAKE ONE CAPSULE BY MOUTH EVERY MORNING 90 capsule 3     No current facility-administered medications for this visit.     No Known Allergies     Problem (# of Occurrences) Relation (Name,Age of Onset)    Hypertension (1) Father (Marvin 3rd)    Osteoporosis (1) Mother (Ana)    Prostate Cancer (1) Father (Marvin 3rd)    Colon Cancer (1) Paternal Grandfather (Marvin)          Social History     Tobacco Use    Smoking status: Never    Smokeless tobacco: Never   Vaping Use    Vaping status: Never Used   Substance Use Topics    Alcohol use: Yes     Comment: Social    Drug use: Never       REVIEW OF SYSTEMS:                                                      10-point  review of systems is negative except as mentioned above in HPI.     EXAM:                                                      Physical Exam:   Vitals: /87   Pulse 95   Wt 77.4 kg (170 lb 9.6 oz)   BMI 33.71 kg/m    BMI= Body mass index is 33.71 kg/m .  GENERAL: NAD.  HEENT: NC/AT.   PULM: Non-labored breathing.   Neurologic:  MENTAL STATUS: Alert, attentive. Speech is fluent. Normal comprehension. Normal concentration. Adequate fund of knowledge.   CRANIAL NERVES: Pupils equally, round and reactive to light. Facial sensation and movement normal. EOM full. Hearing intact to conversation.Trapezius strength intact. Palate moves symmetrically. Tongue midline.  MOTOR: 5/5 in proximal and distal muscle groups of upper and lower extremities. Tone and bulk normal.   DTRs: Intact and symmetric in biceps (actually a little brisk for age), BR, patellae.   SENSATION: Normal light touch throughout.  COORDINATION: Normal finger nose finger.  Hand opening/closing speed and amplitude normal.  STATION AND GAIT: Romberg negative. Good postural reflexes.  Casual gait has good step length, decent armswing.  Daughter-in-law says this is not how Lianet usually walks.  Good turn.  TREMOR: Near constant rolling tremor of the right hand at rest, accentuated with walking.  Right hand-dominant.    ASSESSMENT and PLAN:                                                      Assessment:     ICD-10-CM    1. Primary parkinsonism (H)  G20.C carbidopa-levodopa (SINEMET)  MG tablet     NM Brain Imaging Tomographic (Spect) Datscan      2. Resting tremor  G25.2 Adult Neurology  Referral     carbidopa-levodopa (SINEMET)  MG tablet     NM Brain Imaging Tomographic (Spect) Datscan     Occupational Therapy  Referral          Ms. Mendez is a pleasant 79-year-old woman here for evaluation regarding tremor.  She has had a resting tremor for about 9 months and family has also noticed some correlating shuffling with  walking at times.  Symptoms are consistent with parkinsonism.  We talked about doing a trial of carbidopa/levodopa and potential side effects.  Family also asks about dopamine scanning, after bring this up.  They also ask about seeing a movement specialist but I think this is a little bit premature (notably she was scheduled to see a movement specialist but these appointments were canceled).  We will plan to follow-up in a few months.  We will try some OT in the meantime.    Plan:  Let's start carbidopa/levodopa (25/100mg): 1 tablet 3 times daily.  Try to avoid mealtime as we discussed.  I have put in the order for the dopamine scan in case you would like to do this for diagnostic purposes.  It is best if you are off of the medication for at least 3 days before the testing.  Referral for occupational therapy.  We can consider additional therapies going forward if need be.  Information provided about our Parkinson's exercise class here in clinic.  Return to neurology clinic in 3 months.    Total Time: 60 minutes were spent with the patient and in chart review/documentation (as described above in Assessment and Plan) /coordinating the care on date of service.    Wendy Velasquez MD  Neurology    CC: MD Santino Pereira software used in the dictation of this note.

## 2025-01-13 NOTE — PATIENT INSTRUCTIONS
PLAN:  Let's start carbidopa/levodopa (25/100mg): 1 tablet 3 times daily.  Try to avoid mealtime as we discussed.  I have put in the order for the dopamine scan in case you would like to do this for diagnostic purposes.  It is best if you are off of the medication for at least 3 days before the testing.  Referral for occupational therapy.  We can consider additional therapies going forward if need be.  Information provided about our Parkinson's exercise class here in clinic.  Return to neurology clinic in 3 months.

## 2025-01-13 NOTE — LETTER
1/13/2025      Lianet Mendez  [id number]  [facility] [address]      Dear Colleague,    Thank you for referring your patient, Lianet Mendez, to the [facility] [place]. Please see a copy of my visit note below.    INITIAL NEUROLOGY CONSULTATION    DATE OF VISIT: 1/13/2025  MRN: [id number]  PATIENT NAME: Lianet eMndez  YOB: 1945    REFERRING PROVIDER: Michael Zambrano    Chief Complaint   Patient presents with     Tremors     Symptoms started in Spring of 2024; R hand. Consistent tremors; especially when patient is using her hands do things around the house or just resting. Anxiety and stress triggers as well.      SUBJECTIVE:                                                      HPI:   Lianet Mendez is a 79 year old female whom I have been asked by Dr. Zambrano to see in consultation for resting tremor. Per chart review, there have been a coupe of appointments scheduled with the Movement Disorders clinic (one 2 days ago), and these have been cancelled.     No other reported parkinsonian symptoms per primary care exam. Primidone was offered, but declined in 11.2024. She was then referred on to Neurology.    Additional history of HTN, HLD and chronic cough.    Lianet is here with her son and daughter-in-law today.  They live with her.  I ask about the tremor and Lianet says this has been going on since last spring, family agrees.  Gradual worsening.  Affects the right hand only which is her dominant hand.  That being said fine motor movements such as sewing and stitching are okay.   She cannot tell if there has been any other change in mobility. Some balance problems due to sinuses.    DIL says she shuffles, is hunched over, at times. Sleep is restful, she does not move around a lot in her sleep. No changes in taste or smell.  Appetite is fine.  Recall is fine, maybe processing speed is a little slower than before but no concerns about memory in general.   Voice  has gotten quieter per family.    Family does have some concerns about deconditioning, lack of activity  so they brought up their stationary bike from the basement for Lianet to use.  Daughter-in-law also asks about strength in her right hand and if there is something that can be done for this.  Lianet does not necessarily notice any weakness.    No known family history of neurologic disease, no strokes or neurodegenerative processes as far as Lianet is aware.      Past Medical History:   Diagnosis Date     Breast cancer (H) 1995     Essential hypertension      Melanoma (H) 2019     Past Surgical History:   Procedure Laterality Date     BIOPSY  2019     BREAST SURGERY       COLECTOMY      polyps.     COLONOSCOPY N/A 08/12/2019    Procedure: COLONOSCOPY;  Surgeon: Mario Alberto Villanueva MD;  Location:  GI     masectomy       ORTHOPEDIC SURGERY  2020    Hip replacement       Current Outpatient Medications   Medication Sig Dispense Refill     atorvastatin (LIPITOR) 20 MG tablet Take 1 tablet (20 mg) by mouth daily. 90 tablet 3     carbidopa-levodopa (SINEMET)  MG tablet Take 1 tablet by mouth 3 times daily. 90 tablet 4     cetirizine (ZYRTEC) 10 MG tablet Take 1 tablet (10 mg) by mouth daily. 90 tablet 3     losartan (COZAAR) 100 MG tablet Take 1 tablet (100 mg) by mouth daily. 90 tablet 3     magnesium 250 MG tablet Take 1 tablet by mouth daily       Melatonin 10 MG TABS tablet Take 10 mg by mouth nightly as needed for sleep       triamterene-HCTZ (DYAZIDE) 37.5-25 MG capsule Take 1 capsule by mouth daily. TAKE ONE CAPSULE BY MOUTH EVERY MORNING 90 capsule 3     No current facility-administered medications for this visit.     No Known Allergies     Problem (# of Occurrences) Relation (Name,Age of Onset)    Hypertension (1) Father (Marvin 3rd)    Osteoporosis (1) Mother (Ana)    Prostate Cancer (1) Father (Marvin 3rd)    Colon Cancer (1) Paternal Grandfather (Marvin)          Social History     Tobacco Use      Smoking status: Never     Smokeless tobacco: Never   Vaping Use     Vaping status: Never Used   Substance Use Topics     Alcohol use: Yes     Comment: Social     Drug use: Never       REVIEW OF SYSTEMS:                                                      10-point review of systems is negative except as mentioned above in HPI.     EXAM:                                                      Physical Exam:   Vitals: /87   Pulse 95   Wt 77.4 kg (170 lb 9.6 oz)   BMI 33.71 kg/m    BMI= Body mass index is 33.71 kg/m .  GENERAL: NAD.  HEENT: NC/AT.   PULM: Non-labored breathing.   Neurologic:  MENTAL STATUS: Alert, attentive. Speech is fluent. Normal comprehension. Normal concentration. Adequate fund of knowledge.   CRANIAL NERVES: Pupils equally, round and reactive to light. Facial sensation and movement normal. EOM full. Hearing intact to conversation.Trapezius strength intact. Palate moves symmetrically. Tongue midline.  MOTOR: 5/5 in proximal and distal muscle groups of upper and lower extremities. Tone and bulk normal.   DTRs: Intact and symmetric in biceps (actually a little brisk for age), BR, patellae.   SENSATION: Normal light touch throughout.  COORDINATION: Normal finger nose finger.  Hand opening/closing speed and amplitude normal.  STATION AND GAIT: Romberg negative. Good postural reflexes.  Casual gait has good step length, decent armswing.  Daughter-in-law says this is not how Lianet usually walks.  Good turn.  TREMOR: Near constant rolling tremor of the right hand at rest, accentuated with walking.  Right hand-dominant.    ASSESSMENT and PLAN:                                                      Assessment:     ICD-10-CM    1. Primary parkinsonism (H)  G20.C carbidopa-levodopa (SINEMET)  MG tablet     NM Brain Imaging Tomographic (Spect) Datscan      2. Resting tremor  G25.2 Adult Neurology  Referral     carbidopa-levodopa (SINEMET)  MG tablet     NM Brain Imaging  Tomographic (Spect) Datscan     Occupational Therapy  Referral          Ms. Mendez is a pleasant 79-year-old woman here for evaluation regarding tremor.  She has had a resting tremor for about 9 months and family has also noticed some correlating shuffling with walking at times.  Symptoms are consistent with parkinsonism.  We talked about doing a trial of carbidopa/levodopa and potential side effects.  Family also asks about dopamine scanning, after bring this up.  They also ask about seeing a movement specialist but I think this is a little bit premature (notably she was scheduled to see a movement specialist but these appointments were canceled).  We will plan to follow-up in a few months.  We will try some OT in the meantime.    Plan:  Let's start carbidopa/levodopa (25/100mg): 1 tablet 3 times daily.  Try to avoid mealtime as we discussed.  I have put in the order for the dopamine scan in case you would like to do this for diagnostic purposes.  It is best if you are off of the medication for at least 3 days before the testing.  Referral for occupational therapy.  We can consider additional therapies going forward if need be.  Information provided about our Parkinson's exercise class here in clinic.  Return to neurology clinic in 3 months.    Total Time: 60 minutes were spent with the patient and in chart review/documentation (as described above in Assessment and Plan) /coordinating the care on date of service.    Wendy Velasquez MD  Neurology    CC: Michael Zambrano MD    Dragon software used in the dictation of this note.      Again, thank you for allowing me to participate in the care of your patient.        Sincerely,        Wendy Velasquez MD    Electronically signed

## 2025-01-13 NOTE — NURSING NOTE
"Lianet Mendez is a 79 year old female who presents for:  Chief Complaint   Patient presents with    Tremors     Symptoms started in Spring of 2024; R hand. Consistent tremors; especially when patient is using her hands do things around the house or just resting. Anxiety and stress triggers as well.         Initial Vitals:  /87   Pulse 95   Wt 77.4 kg (170 lb 9.6 oz)   BMI 33.71 kg/m   Estimated body mass index is 33.71 kg/m  as calculated from the following:    Height as of 11/18/24: 1.515 m (4' 11.65\").    Weight as of this encounter: 77.4 kg (170 lb 9.6 oz).. Body surface area is 1.8 meters squared. BP completed using cuff size: wrist cuff    Jarrod Quintanilla  "

## 2025-01-21 ENCOUNTER — MYC MEDICAL ADVICE (OUTPATIENT)
Dept: INTERNAL MEDICINE | Facility: CLINIC | Age: 80
End: 2025-01-21
Payer: COMMERCIAL

## 2025-01-21 DIAGNOSIS — R09.82 POST-NASAL DRIP: Primary | ICD-10-CM

## 2025-02-17 ENCOUNTER — PRE VISIT (OUTPATIENT)
Dept: PULMONOLOGY | Facility: CLINIC | Age: 80
End: 2025-02-17

## 2025-02-17 ENCOUNTER — OFFICE VISIT (OUTPATIENT)
Dept: PULMONOLOGY | Facility: CLINIC | Age: 80
End: 2025-02-17
Payer: COMMERCIAL

## 2025-02-17 VITALS
HEART RATE: 92 BPM | WEIGHT: 170 LBS | BODY MASS INDEX: 33.38 KG/M2 | DIASTOLIC BLOOD PRESSURE: 92 MMHG | HEIGHT: 60 IN | SYSTOLIC BLOOD PRESSURE: 144 MMHG | OXYGEN SATURATION: 99 %

## 2025-02-17 DIAGNOSIS — J18.1 LUNG CONSOLIDATION: ICD-10-CM

## 2025-02-17 DIAGNOSIS — R05.3 CHRONIC COUGH: Primary | ICD-10-CM

## 2025-02-17 DIAGNOSIS — J45.909 MODERATE ASTHMA WITHOUT COMPLICATION, UNSPECIFIED WHETHER PERSISTENT: ICD-10-CM

## 2025-02-17 DIAGNOSIS — R05.9 COUGH: ICD-10-CM

## 2025-02-17 PROCEDURE — 99204 OFFICE O/P NEW MOD 45 MIN: CPT | Mod: 25

## 2025-02-17 PROCEDURE — G0463 HOSPITAL OUTPT CLINIC VISIT: HCPCS

## 2025-02-17 PROCEDURE — 94150 VITAL CAPACITY TEST: CPT | Performed by: INTERNAL MEDICINE

## 2025-02-17 PROCEDURE — 94726 PLETHYSMOGRAPHY LUNG VOLUMES: CPT | Performed by: INTERNAL MEDICINE

## 2025-02-17 PROCEDURE — 94729 DIFFUSING CAPACITY: CPT | Performed by: INTERNAL MEDICINE

## 2025-02-17 PROCEDURE — 94375 RESPIRATORY FLOW VOLUME LOOP: CPT | Performed by: INTERNAL MEDICINE

## 2025-02-17 RX ORDER — ALBUTEROL SULFATE 90 UG/1
2 INHALANT RESPIRATORY (INHALATION) EVERY 6 HOURS PRN
Qty: 18 G | Refills: 3 | Status: SHIPPED | OUTPATIENT
Start: 2025-02-17

## 2025-02-17 RX ORDER — FLUTICASONE PROPIONATE AND SALMETEROL 250; 50 UG/1; UG/1
1 POWDER RESPIRATORY (INHALATION) 2 TIMES DAILY
Qty: 60 EACH | Refills: 2 | Status: SHIPPED | OUTPATIENT
Start: 2025-02-17

## 2025-02-17 RX ORDER — MULTIVIT WITH MINERALS/LUTEIN
1000 TABLET ORAL
COMMUNITY

## 2025-02-17 RX ORDER — VIT C/HESPERIDIN/BIOFLAVONOIDS 500-100 MG
TABLET ORAL
COMMUNITY

## 2025-02-17 ASSESSMENT — PAIN SCALES - GENERAL: PAINLEVEL_OUTOF10: NO PAIN (0)

## 2025-02-17 NOTE — NURSING NOTE
Chief Complaint   Patient presents with    Consult     New Cough     Medications reviewed and vital signs taken.   Gee Montez CMA

## 2025-02-17 NOTE — PATIENT INSTRUCTIONS
Please get the chest CT scan.  I will be in touch with you after I get the results.     Try using wixela twice a day to help out with cough.  I am treating possible asthma with this.  Use it at least a month.  If it is not helping at all, message me and we can try a different inhaler.     I will have my [facility] nurses get in touch with you about follow up in May.

## 2025-02-17 NOTE — PROGRESS NOTES
Cough ongoing for years, post-nasal drainage. Has had sinus infections, has been treated with antibiotics.      Cough, clear sputum. Has felt wheezing occasionally.     Very rarely acid reflux, no medications.     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

## 2025-02-17 NOTE — PROGRESS NOTES
[facility] [address] [address]  Phone: [phone]  Fax: [phone]    Patient:  Lianet Mendez, Date of birth 1945  Date of Visit:  02/17/2025  Referring Provider Referred Self    Assessment & Plan    # Persistent cough   # Concern for asthma   # Allergic rhinitis   # RML infiltrate   Patient with long-standing productive cough, along with occasional dyspnea, chest tightness and wheezing. No tobacco use. Previous chest xray in November 2024 showed possible RML infiltrate and per patient, felt that her cough was worse around that time. Treated for sinus infection then. Patient does have a R breast implant but usually the implant is more well defined on xray so question if patient had a pneumonia that was untreated. Currently, no fevers or exacerbation in productive cough or dyspnea. Patient's PFTs are normal except for high residual volume which can be seen in airway trapping. Although PFTs do not show obstructive pattern, based on her above symptoms, we cannot exclude asthma. Furthermore, patient does have a history of post-nasal drainage which could be allergic rhinitis and is likely contributing to cough. She is currently on zyrtec, has trialed nasal spray in the past without improvement. Will start patient on ICS-LABA and albuterol PRN for at least a month to see if improvement in symptoms. Will also evaluate RML infiltrate with CT scan.    - Start ICS-LABA and albuterol PRN   - CT chest ordered to further evaluate RML infiltrate   - Continue zyrtec    - Follow up symptoms in May in [facility]. Patient moving to [place] in June.        Denisse Hamm MD   Internal Medicine, PGY3     Patient seen with the attending physician, Dr. Malloy.     Physician Attestation   I, Cj Malloy MD, saw this patient and agree with the findings and plan of care as documented in the note.      Items personally  reviewed/procedural attestation: vitals, labs, imaging and agree with the interpretation documented in the note, and spirometry report and agree with the interpretation documented in the note.    History of chronic cough, diagnosed with pneumonia in 11/2024.   CXR with large right lung opacity.  Still with cough, wheeze, shortness of breath.    PFTs unrevealing.   Cough could be an airways cause such as asthma.    She needs repeat imaging to demonstrate improvement in the right lung opacity.    Will trial ICS/LABA.      Cj Malloy MD     Medical Decision Making             Cj Malloy MD            Today's visit note:     Chief Complaint: Consult (New Cough )      HISTORY OF PRESENT ILLNESS:    Lianet Mendez is a 79 year old year old female who is being seen for persistent cough.     Patient has a cough that has been ongoing for years. It is productive most of the time, clear sputum, but sometimes it is a dry cough. She does have a history of post-nasal drainage and has been treated for multiple sinus infections. She is currently on zyrtec. Says that her nasal symptoms tend to be worse in the fall and winter. Has tried nasal spray in the past without improvement. She is scheduled to see ENT in March for her sinus symptoms. Patient says the cough is persistent even when she is not having nasal symptoms. Daughter in law has noticed that when patient has these coughing spells, she will also have wheezing. Patient and family have noticed that she will be become short of breath with high exertion or fast walking. When the wheezing and dyspnea occur, patient does report some chest tightness. She denies chest pain or palpitations. She is a never smoker. She worked as a teacher. She was recently diagnosed with parkinson's disease. Her daughter has asthma and her father did as well.          Past Medical and Surgical History:     Past Medical History:   Diagnosis Date    Breast cancer (H) 1995     Essential hypertension     Melanoma (H) 2019     Past Surgical History:   Procedure Laterality Date    BIOPSY  2019    BREAST SURGERY      COLECTOMY      polyps.    COLONOSCOPY N/A 08/12/2019    Procedure: COLONOSCOPY;  Surgeon: Mario Alberto Villanueva MD;  Location:  GI    masectomy      ORTHOPEDIC SURGERY  2020    Hip replacement           Family History:     Family History   Problem Relation Age of Onset    Osteoporosis Mother     Hypertension Father     Prostate Cancer Father     Colon Cancer Paternal Grandfather             Social History:     Social History     Socioeconomic History    Marital status: Single     Spouse name: Not on file    Number of children: Not on file    Years of education: Not on file    Highest education level: Not on file   Occupational History    Not on file   Tobacco Use    Smoking status: Never    Smokeless tobacco: Never   Vaping Use    Vaping status: Never Used   Substance and Sexual Activity    Alcohol use: Yes     Comment: Social    Drug use: Never    Sexual activity: Not Currently     Partners: Male     Birth control/protection: Female Surgical   Other Topics Concern    Parent/sibling w/ CABG, MI or angioplasty before 65F 55M? No   Social History Narrative    Not on file     Social Drivers of Health     Financial Resource Strain: Low Risk  (11/18/2024)    Financial Resource Strain     Within the past 12 months, have you or your family members you live with been unable to get utilities (heat, electricity) when it was really needed?: No   Food Insecurity: Low Risk  (11/18/2024)    Food Insecurity     Within the past 12 months, did you worry that your food would run out before you got money to buy more?: No     Within the past 12 months, did the food you bought just not last and you didn t have money to get more?: No   Transportation Needs: Low Risk  (11/18/2024)    Transportation Needs     Within the past 12 months, has lack of transportation kept you from medical appointments,  getting your medicines, non-medical meetings or appointments, work, or from getting things that you need?: No   Physical Activity: Insufficiently Active (11/18/2024)    Exercise Vital Sign     Days of Exercise per Week: 1 day     Minutes of Exercise per Session: 10 min   Stress: No Stress Concern Present (11/18/2024)    [employer] - Occupational Stress Questionnaire     Feeling of Stress : Not at all   Social Connections: Unknown (11/18/2024)    Social Connection and Isolation Panel [NHANES]     Frequency of Communication with Friends and Family: Not on file     Frequency of Social Gatherings with Friends and Family: Patient declined     Attends Protestant Services: Not on file     Active Member of Clubs or Organizations: Not on file     Attends Club or Organization Meetings: Not on file     Marital Status: Not on file   Interpersonal Safety: Low Risk  (11/18/2024)    Interpersonal Safety     Do you feel physically and emotionally safe where you currently live?: Yes     Within the past 12 months, have you been hit, slapped, kicked or otherwise physically hurt by someone?: No     Within the past 12 months, have you been humiliated or emotionally abused in other ways by your partner or ex-partner?: No   Housing Stability: Low Risk  (11/18/2024)    Housing Stability     Do you have housing? : Yes     Are you worried about losing your housing?: No            Medications:     Current Outpatient Medications   Medication Sig Dispense Refill    atorvastatin (LIPITOR) 20 MG tablet Take 1 tablet (20 mg) by mouth daily. 90 tablet 3    carbidopa-levodopa (SINEMET)  MG tablet Take 1 tablet by mouth 3 times daily. 90 tablet 4    cetirizine (ZYRTEC) 10 MG tablet Take 1 tablet (10 mg) by mouth daily. 90 tablet 3    losartan (COZAAR) 100 MG tablet Take 1 tablet (100 mg) by mouth daily. 90 tablet 3    magnesium 250 MG tablet Take 1 tablet by mouth daily      Melatonin 10 MG TABS tablet Take 10 mg by  "mouth nightly as needed for sleep      triamterene-HCTZ (DYAZIDE) 37.5-25 MG capsule Take 1 capsule by mouth daily. TAKE ONE CAPSULE BY MOUTH EVERY MORNING 90 capsule 3    vitamin C (ASCORBIC ACID) 1000 MG TABS 1,000 mg.      Zinc 30 MG TABS        No current facility-administered medications for this visit.            Review of Systems:     A complete review of systems was otherwise negative except as noted in the HPI.    PHYSICAL EXAM:  BP (!) 144/92   Pulse 92   Ht 1.514 m (4' 11.6\")   Wt 77.1 kg (170 lb)   SpO2 99%   BMI 33.65 kg/m       General: Comfortable, No apparent distress  Eyes: Anicteric  Ears: Hearing grossly normal  Mouth: Oral mucosa is moist, without any lesions.  Respiratory: Good air movement. No crackles. No rhonchi. No wheezes  Cardiac: RRR, normal S1, S2. Systolic murmur present.   Musculoskeletal: Extremities normal. No edema.   Skin: No rash on limited exam  Neuro: Normal mentation. Normal speech.  Psych:Normal affect           Data:   All laboratory and imaging data reviewed.      PFT:   FEV1/FVC ratio 71. FEV1 1.54 (95% predicted). FVC 2.15 (104% predicted). DLCO 16.75 (101% predicted).          Latest Ref Rng & Units 2/17/2025     6:52 AM   PFT   FVC L 2.15  P   FEV1 L 1.54  P   FVC% % 104  P   FEV1% % 95  P      P Preliminary result      PFT Interpretation:  Normal PFTs with high residual volume   Valid Maneuver    CXR: I have reviewed the CXR images from November.                                                  IMPRESSION: Question a right midlung infiltrate vs. overlying soft  tissues. No effusions. The cardiac silhouette is not enlarged.  Pulmonary vasculature is unremarkable.  Recent Results (from the past week)   Pulmonary function test    Collection Time: 02/17/25  6:52 AM   Result Value Ref Range    FVC-Pred 2.05 L    FVC-Pre 2.15 L    FVC-%Pred-Pre 104 %    FEV1-Pre 1.54 L    FEV1-%Pred-Pre 95 %    FEV1FVC-Pred 79 %    FEV1FVC-Pre 71 %    FEFMax-Pred 4.39 L/sec    FEFMax-Pre " 4.52 L/sec    FEFMax-%Pred-Pre 102 %    FEF2575-Pred 1.40 L/sec    FEF2575-Pre 0.98 L/sec    EUA0876-%Pred-Pre 69 %    ExpTime-Pre 6.10 sec    FIFMax-Pre 3.65 L/sec    VC-Pred 2.53 L    VC-Pre 2.29 L    VC-%Pred-Pre 90 %    IC-Pred 1.45 L    IC-Pre 2.15 L    IC-%Pred-Pre 147 %    ERV-Pred 0.73 L    ERV-Pre 0.14 L    ERV-%Pred-Pre 19 %    FEV1FEV6-Pred 78 %    FEV1FEV6-Pre 71 %    FRCPleth-Pred 2.51 L    FRCPleth-Pre 2.90 L    FRCPleth-%Pred-Pre 115 %    RVPleth-Pred 1.98 L    RVPleth-Pre 2.75 L    RVPleth-%Pred-Pre 139 %    TLCPleth-Pred 4.20 L    TLCPleth-Pre 5.04 L    TLCPleth-%Pred-Pre 120 %    DLCOunc-Pred 16.47 ml/min/mmHg    DLCOunc-Pre 16.75 ml/min/mmHg    DLCOunc-%Pred-Pre 101 %    VA-Pre 4.06 L    VA-%Pred-Pre 106 %    FEV1SVC-Pred 63 %    FEV1SVC-Pre 67 %

## 2025-02-17 NOTE — LETTER
2/17/2025      Lianet Mendez  [address]  [facility] [address]      Dear Colleague,    Thank you for referring your patient, Lianet Mendez, to the [facility]. Please see a copy of my visit note below.      [facility]  [address] [facility] [address]  Phone: [phone]  Fax: [phone]    Patient:  Lianet Mendez, Date of birth 1945  Date of Visit:  02/17/2025  Referring Provider Referred Self    Assessment & Plan   # Persistent cough   # Concern for asthma   # Allergic rhinitis   # RML infiltrate   Patient with long-standing productive cough, along with occasional dyspnea, chest tightness and wheezing. No tobacco use. Previous chest xray in November 2024 showed possible RML infiltrate and per patient, felt that her cough was worse around that time. Treated for sinus infection then. Patient does have a R breast implant but usually the implant is more well defined on xray so question if patient had a pneumonia that was untreated. Currently, no fevers or exacerbation in productive cough or dyspnea. Patient's PFTs are normal except for high residual volume which can be seen in airway trapping. Although PFTs do not show obstructive pattern, based on her above symptoms, we cannot exclude asthma. Furthermore, patient does have a history of post-nasal drainage which could be allergic rhinitis and is likely contributing to cough. She is currently on zyrtec, has trialed nasal spray in the past without improvement. Will start patient on ICS-LABA and albuterol PRN for at least a month to see if improvement in symptoms. Will also evaluate RML infiltrate with CT scan.    - Start ICS-LABA and albuterol PRN   - CT chest ordered to further evaluate RML infiltrate   - Continue zyrtec    - Follow up symptoms in May in [facility]. Patient moving to [place] in June.         Denisse Hamm MD   Internal Medicine, PGY3     Patient seen with the attending physician, Dr. Malloy.     Physician Attestation  I, Cj Malloy MD, saw this patient and agree with the findings and plan of care as documented in the note.      Items personally reviewed/procedural attestation: vitals, labs, imaging and agree with the interpretation documented in the note, and spirometry report and agree with the interpretation documented in the note.    History of chronic cough, diagnosed with pneumonia in 11/2024.   CXR with large right lung opacity.  Still with cough, wheeze, shortness of breath.    PFTs unrevealing.   Cough could be an airways cause such as asthma.    She needs repeat imaging to demonstrate improvement in the right lung opacity.    Will trial ICS/LABA.      Cj Malloy MD     Medical Decision Making            Cj Malloy MD            Today's visit note:     Chief Complaint: Consult (New Cough )      HISTORY OF PRESENT ILLNESS:    Lianet Mendez is a 79 year old year old female who is being seen for persistent cough.     Patient has a cough that has been ongoing for years. It is productive most of the time, clear sputum, but sometimes it is a dry cough. She does have a history of post-nasal drainage and has been treated for multiple sinus infections. She is currently on zyrtec. Says that her nasal symptoms tend to be worse in the fall and winter. Has tried nasal spray in the past without improvement. She is scheduled to see ENT in March for her sinus symptoms. Patient says the cough is persistent even when she is not having nasal symptoms. Daughter in law has noticed that when patient has these coughing spells, she will also have wheezing. Patient and family have noticed that she will be become short of breath with high exertion or fast walking. When the wheezing and dyspnea occur, patient does report some chest tightness. She denies chest pain or  palpitations. She is a never smoker. She worked as a teacher. She was recently diagnosed with parkinson's disease. Her daughter has asthma and her father did as well.          Past Medical and Surgical History:     Past Medical History:   Diagnosis Date     Breast cancer (H) 1995     Essential hypertension      Melanoma (H) 2019     Past Surgical History:   Procedure Laterality Date     BIOPSY  2019     BREAST SURGERY       COLECTOMY      polyps.     COLONOSCOPY N/A 08/12/2019    Procedure: COLONOSCOPY;  Surgeon: Mario Alberto Villanueva MD;  Location:  GI     masectomy       ORTHOPEDIC SURGERY  2020    Hip replacement           Family History:     Family History   Problem Relation Age of Onset     Osteoporosis Mother      Hypertension Father      Prostate Cancer Father      Colon Cancer Paternal Grandfather             Social History:     Social History     Socioeconomic History     Marital status: Single     Spouse name: Not on file     Number of children: Not on file     Years of education: Not on file     Highest education level: Not on file   Occupational History     Not on file   Tobacco Use     Smoking status: Never     Smokeless tobacco: Never   Vaping Use     Vaping status: Never Used   Substance and Sexual Activity     Alcohol use: Yes     Comment: Social     Drug use: Never     Sexual activity: Not Currently     Partners: Male     Birth control/protection: Female Surgical   Other Topics Concern     Parent/sibling w/ CABG, MI or angioplasty before 65F 55M? No   Social History Narrative     Not on file     Social Drivers of Health     Financial Resource Strain: Low Risk  (11/18/2024)    Financial Resource Strain      Within the past 12 months, have you or your family members you live with been unable to get utilities (heat, electricity) when it was really needed?: No   Food Insecurity: Low Risk  (11/18/2024)    Food Insecurity      Within the past 12 months, did you worry that your food would run out before  you got money to buy more?: No      Within the past 12 months, did the food you bought just not last and you didn t have money to get more?: No   Transportation Needs: Low Risk  (11/18/2024)    Transportation Needs      Within the past 12 months, has lack of transportation kept you from medical appointments, getting your medicines, non-medical meetings or appointments, work, or from getting things that you need?: No   Physical Activity: Insufficiently Active (11/18/2024)    Exercise Vital Sign      Days of Exercise per Week: 1 day      Minutes of Exercise per Session: 10 min   Stress: No Stress Concern Present (11/18/2024)    [employer] - Occupational Stress Questionnaire      Feeling of Stress : Not at all   Social Connections: Unknown (11/18/2024)    Social Connection and Isolation Panel [NHANES]      Frequency of Communication with Friends and Family: Not on file      Frequency of Social Gatherings with Friends and Family: Patient declined      Attends Christian Services: Not on file      Active Member of Clubs or Organizations: Not on file      Attends Club or Organization Meetings: Not on file      Marital Status: Not on file   Interpersonal Safety: Low Risk  (11/18/2024)    Interpersonal Safety      Do you feel physically and emotionally safe where you currently live?: Yes      Within the past 12 months, have you been hit, slapped, kicked or otherwise physically hurt by someone?: No      Within the past 12 months, have you been humiliated or emotionally abused in other ways by your partner or ex-partner?: No   Housing Stability: Low Risk  (11/18/2024)    Housing Stability      Do you have housing? : Yes      Are you worried about losing your housing?: No            Medications:     Current Outpatient Medications   Medication Sig Dispense Refill     atorvastatin (LIPITOR) 20 MG tablet Take 1 tablet (20 mg) by mouth daily. 90 tablet 3     carbidopa-levodopa (SINEMET)  MG  "tablet Take 1 tablet by mouth 3 times daily. 90 tablet 4     cetirizine (ZYRTEC) 10 MG tablet Take 1 tablet (10 mg) by mouth daily. 90 tablet 3     losartan (COZAAR) 100 MG tablet Take 1 tablet (100 mg) by mouth daily. 90 tablet 3     magnesium 250 MG tablet Take 1 tablet by mouth daily       Melatonin 10 MG TABS tablet Take 10 mg by mouth nightly as needed for sleep       triamterene-HCTZ (DYAZIDE) 37.5-25 MG capsule Take 1 capsule by mouth daily. TAKE ONE CAPSULE BY MOUTH EVERY MORNING 90 capsule 3     vitamin C (ASCORBIC ACID) 1000 MG TABS 1,000 mg.       Zinc 30 MG TABS        No current facility-administered medications for this visit.            Review of Systems:     A complete review of systems was otherwise negative except as noted in the HPI.    PHYSICAL EXAM:  BP (!) 144/92   Pulse 92   Ht 1.514 m (4' 11.6\")   Wt 77.1 kg (170 lb)   SpO2 99%   BMI 33.65 kg/m       General: Comfortable, No apparent distress  Eyes: Anicteric  Ears: Hearing grossly normal  Mouth: Oral mucosa is moist, without any lesions.  Respiratory: Good air movement. No crackles. No rhonchi. No wheezes  Cardiac: RRR, normal S1, S2. Systolic murmur present.   Musculoskeletal: Extremities normal. No edema.   Skin: No rash on limited exam  Neuro: Normal mentation. Normal speech.  Psych:Normal affect           Data:   All laboratory and imaging data reviewed.      PFT:   FEV1/FVC ratio 71. FEV1 1.54 (95% predicted). FVC 2.15 (104% predicted). DLCO 16.75 (101% predicted).          Latest Ref Rng & Units 2/17/2025     6:52 AM   PFT   FVC L 2.15  P   FEV1 L 1.54  P   FVC% % 104  P   FEV1% % 95  P      P Preliminary result      PFT Interpretation:  Normal PFTs with high residual volume   Valid Maneuver    CXR: I have reviewed the CXR images from November.                                                  IMPRESSION: Question a right midlung infiltrate vs. overlying soft  tissues. No effusions. The cardiac silhouette is not " enlarged.  Pulmonary vasculature is unremarkable.  Recent Results (from the past week)   Pulmonary function test    Collection Time: 02/17/25  6:52 AM   Result Value Ref Range    FVC-Pred 2.05 L    FVC-Pre 2.15 L    FVC-%Pred-Pre 104 %    FEV1-Pre 1.54 L    FEV1-%Pred-Pre 95 %    FEV1FVC-Pred 79 %    FEV1FVC-Pre 71 %    FEFMax-Pred 4.39 L/sec    FEFMax-Pre 4.52 L/sec    FEFMax-%Pred-Pre 102 %    FEF2575-Pred 1.40 L/sec    FEF2575-Pre 0.98 L/sec    LZI9581-%Pred-Pre 69 %    ExpTime-Pre 6.10 sec    FIFMax-Pre 3.65 L/sec    VC-Pred 2.53 L    VC-Pre 2.29 L    VC-%Pred-Pre 90 %    IC-Pred 1.45 L    IC-Pre 2.15 L    IC-%Pred-Pre 147 %    ERV-Pred 0.73 L    ERV-Pre 0.14 L    ERV-%Pred-Pre 19 %    FEV1FEV6-Pred 78 %    FEV1FEV6-Pre 71 %    FRCPleth-Pred 2.51 L    FRCPleth-Pre 2.90 L    FRCPleth-%Pred-Pre 115 %    RVPleth-Pred 1.98 L    RVPleth-Pre 2.75 L    RVPleth-%Pred-Pre 139 %    TLCPleth-Pred 4.20 L    TLCPleth-Pre 5.04 L    TLCPleth-%Pred-Pre 120 %    DLCOunc-Pred 16.47 ml/min/mmHg    DLCOunc-Pre 16.75 ml/min/mmHg    DLCOunc-%Pred-Pre 101 %    VA-Pre 4.06 L    VA-%Pred-Pre 106 %    FEV1SVC-Pred 63 %    FEV1SVC-Pre 67 %         Again, thank you for allowing me to participate in the care of your patient.        Sincerely,        Cj Malloy MD    Electronically signed

## 2025-02-18 LAB
DLCOUNC-%PRED-PRE: 101 %
DLCOUNC-PRE: 16.75 ML/MIN/MMHG
DLCOUNC-PRED: 16.47 ML/MIN/MMHG
ERV-%PRED-PRE: 19 %
ERV-PRE: 0.14 L
ERV-PRED: 0.73 L
EXPTIME-PRE: 6.1 SEC
FEF2575-%PRED-PRE: 69 %
FEF2575-PRE: 0.98 L/SEC
FEF2575-PRED: 1.4 L/SEC
FEFMAX-%PRED-PRE: 102 %
FEFMAX-PRE: 4.52 L/SEC
FEFMAX-PRED: 4.39 L/SEC
FEV1-%PRED-PRE: 95 %
FEV1-PRE: 1.54 L
FEV1FEV6-PRE: 71 %
FEV1FEV6-PRED: 78 %
FEV1FVC-PRE: 71 %
FEV1FVC-PRED: 79 %
FEV1SVC-PRE: 67 %
FEV1SVC-PRED: 63 %
FIFMAX-PRE: 3.65 L/SEC
FRCPLETH-%PRED-PRE: 115 %
FRCPLETH-PRE: 2.9 L
FRCPLETH-PRED: 2.51 L
FVC-%PRED-PRE: 104 %
FVC-PRE: 2.15 L
FVC-PRED: 2.05 L
IC-%PRED-PRE: 147 %
IC-PRE: 2.15 L
IC-PRED: 1.45 L
RVPLETH-%PRED-PRE: 139 %
RVPLETH-PRE: 2.75 L
RVPLETH-PRED: 1.98 L
TLCPLETH-%PRED-PRE: 120 %
TLCPLETH-PRE: 5.04 L
TLCPLETH-PRED: 4.2 L
VA-%PRED-PRE: 106 %
VA-PRE: 4.06 L
VC-%PRED-PRE: 90 %
VC-PRE: 2.29 L
VC-PRED: 2.53 L

## 2025-02-20 NOTE — PROGRESS NOTES
ENT Consultation    Lianet Mendez who is a 79 year old female seen in consultation at the request of Dr. Michael Zambrano.      History of Present Illness - Lianet Mendez is a 79 year old female patient presents with feeling of plugged ears has had issues with a lot of cerumen impaction that was difficult to remove.  The second main problem is for the last few years she has had a lot of facial pressure in the maxillary and ethmoid areas nasal congestion clear to yellowish and greenish rhinorrhea off-and-on has been on several antibiotics for acute sinusitis this winter with some relief.  Sense of smell has gradually decreased sense of taste appears to be intact.  Denies any known history of seasonal or perennial environmental allergies.  She tried cetirizine and Flonase without much relief.      Body mass index is 33.25 kg/m .        BP Readings from Last 1 Encounters:   03/06/25 118/70       BP noted to be well controlled today in office.         Past Medical History -   Past Medical History:   Diagnosis Date    Breast cancer (H) 1995    Essential hypertension     Melanoma (H) 2019       Current Medications -   Current Outpatient Medications:     albuterol (PROAIR HFA/PROVENTIL HFA/VENTOLIN HFA) 108 (90 Base) MCG/ACT inhaler, Inhale 2 puffs into the lungs every 6 hours as needed for shortness of breath, wheezing or cough., Disp: 18 g, Rfl: 3    atorvastatin (LIPITOR) 20 MG tablet, Take 1 tablet (20 mg) by mouth daily., Disp: 90 tablet, Rfl: 3    carbidopa-levodopa (SINEMET)  MG tablet, Take 1.5 tablets by mouth 3 times daily., Disp: 135 tablet, Rfl: 4    cetirizine (ZYRTEC) 10 MG tablet, Take 1 tablet (10 mg) by mouth daily., Disp: 90 tablet, Rfl: 3    fluticasone-salmeterol (WIXELA INHUB) 250-50 MCG/ACT inhaler, Inhale 1 puff into the lungs 2 times daily., Disp: 60 each, Rfl: 2    losartan (COZAAR) 100 MG tablet, Take 1 tablet (100 mg) by mouth daily., Disp: 90 tablet, Rfl: 3    magnesium 250 MG tablet,  Take 1 tablet by mouth daily, Disp: , Rfl:     Melatonin 10 MG TABS tablet, Take 10 mg by mouth nightly as needed for sleep, Disp: , Rfl:     triamterene-HCTZ (DYAZIDE) 37.5-25 MG capsule, Take 1 capsule by mouth daily. TAKE ONE CAPSULE BY MOUTH EVERY MORNING, Disp: 90 capsule, Rfl: 3    vitamin C (ASCORBIC ACID) 1000 MG TABS, 1,000 mg., Disp: , Rfl:     Zinc 30 MG TABS, , Disp: , Rfl:     Allergies - No Known Allergies    Social History -   Social History     Socioeconomic History    Marital status: Single   Tobacco Use    Smoking status: Never    Smokeless tobacco: Never   Vaping Use    Vaping status: Never Used   Substance and Sexual Activity    Alcohol use: Yes     Comment: Social    Drug use: Never    Sexual activity: Not Currently     Partners: Male     Birth control/protection: Female Surgical   Other Topics Concern    Parent/sibling w/ CABG, MI or angioplasty before 65F 55M? No     Social Drivers of Health     Financial Resource Strain: Low Risk  (11/18/2024)    Financial Resource Strain     Within the past 12 months, have you or your family members you live with been unable to get utilities (heat, electricity) when it was really needed?: No   Food Insecurity: Low Risk  (11/18/2024)    Food Insecurity     Within the past 12 months, did you worry that your food would run out before you got money to buy more?: No     Within the past 12 months, did the food you bought just not last and you didn t have money to get more?: No   Transportation Needs: Low Risk  (11/18/2024)    Transportation Needs     Within the past 12 months, has lack of transportation kept you from medical appointments, getting your medicines, non-medical meetings or appointments, work, or from getting things that you need?: No   Physical Activity: Insufficiently Active (11/18/2024)    Exercise Vital Sign     Days of Exercise per Week: 1 day     Minutes of Exercise per Session: 10 min   Stress: No Stress Concern Present (11/18/2024)    [place]  [employer]ational [employer] - Occupational Stress Questionnaire     Feeling of Stress : Not at all   Social Connections: Unknown (11/18/2024)    Social Connection and Isolation Panel [NHANES]     Frequency of Social Gatherings with Friends and Family: Patient declined   Interpersonal Safety: Low Risk  (11/18/2024)    Interpersonal Safety     Do you feel physically and emotionally safe where you currently live?: Yes     Within the past 12 months, have you been hit, slapped, kicked or otherwise physically hurt by someone?: No     Within the past 12 months, have you been humiliated or emotionally abused in other ways by your partner or ex-partner?: No   Housing Stability: Low Risk  (11/18/2024)    Housing Stability     Do you have housing? : Yes     Are you worried about losing your housing?: No       Family History -   Family History   Problem Relation Age of Onset    Osteoporosis Mother     Hypertension Father     Prostate Cancer Father     Colon Cancer Paternal Grandfather        Review of Systems - As per HPI and PMHx, otherwise review of system review of the head and neck negative. Otherwise 10+ review of system is negative    Physical Exam  /70 (BP Location: Right arm, Cuff Size: Adult Regular)   Temp 97.1  F (36.2  C) (Temporal)   Ht 1.514 m (4' 11.6\")   Wt 76.2 kg (168 lb)   BMI 33.25 kg/m    BMI: Body mass index is 33.25 kg/m .    General - The patient is well nourished and well developed, and appears to have good nutritional status.  Alert and oriented to person and place, answers questions and cooperates with examination appropriately.    SKIN - No suspicious lesions or rashes.  Respiration - No respiratory distress.  Head and Face - Normocephalic and atraumatic, with no gross asymmetry noted of the contour of the facial features.  The facial nerve is intact, with strong symmetric movements.    Voice and Breathing - The patient was breathing comfortably without the use of accessory muscles. " The patients voice was clear and strong, and had appropriate pitch and quality.    Ears - Bilateral pinna and EACs with normal appearing overlying skin. Tympanic membrane intact with good mobility on pneumatic otoscopy bilaterally. Bony landmarks of the ossicular chain are normal. The tympanic membranes are normal in appearance. No retraction, perforation, or masses.  No fluid or purulence was seen in the external canal or the middle ear.  Exam was after cerumen disimpaction which made her hear better subjectively.    Eyes - Extraocular movements intact.  Sclera were not icteric or injected, conjunctiva were pink and moist.    Mouth - Examination of the oral cavity showed pink, healthy oral mucosa. No lesions or ulcerations noted.  The tongue was mobile and midline, and the dentition were in good condition.      Throat - The walls of the oropharynx were smooth, pink, moist, symmetric, and had no lesions or ulcerations.  The tonsillar pillars and soft palate were symmetric.  The uvula was midline on elevation.    Neck - Normal midline excursion of the laryngotracheal complex during swallowing.  Full range of motion on passive movement.  Palpation of the occipital, submental, submandibular, internal jugular chain, and supraclavicular nodes did not demonstrate any abnormal lymph nodes or masses.  The carotid pulse was palpable bilaterally.  Palpation of the thyroid was soft and smooth, with no nodules or goiter appreciated.  The trachea was mobile and midline.    Nose - External contour is symmetric, no gross deflection or scars.  Nasal mucosa is pink and moist with no abnormal mucus.  The septum was midline and non-obstructive, turbinates of normal size and position.  No polyps, masses, or purulence noted on examination.    Neuro - Nonfocal neuro exam is normal, CN 2 through 12 intact, normal gait and muscle tone.      Performed in clinic today:  To further evaluate the nasal cavity, I performed rigid nasal endoscopy.   I first sprayed the nasal cavity bilaterally with a mix of lidocaine and neosynephrine.  I then began on the left side using a 2.7mm, 30 degree rigid nasal endoscope.  The septum was straight and the nasal airway was obstructed.  No abnormal secretions, purulence, or polyps were noted. The left middle turbinate and middle meatus were clearly visualized and large amount of polypoid material was noted in each middle meatus.  Looking up, the olfactory cleft was unobstructed.  Going further back, the sphenoethmoid recess was normal in appearance, with healthy appearing mucosa on the face of the sphenoid.  The nasopharynx was unremarkable, and the eustachian tube opening on this side was unobstructed.    I then turned my attention to the right side.  Once again, the septum was straight, and the airway was obstructed.  No abnormal secretions, purulence, polyps were noted.  The right middle turbinate and middle meatus were clearly visualized and large amount of polypoid material was noted in the middle meatus with inflammation.  Looking up, the olfactory cleft was unobstructed.  Going further back the right sphenoethmoid recess was normal in appearance, and eustachian tube opening was unobstructed.   Red - [id number] [facility]      A/P - Lianet Mendez is a 79 year old female patient with successful disimpaction of cerumen today.  The patient has what appears to be sinus polyps and chronic sinusitis.  At this point we will put on doxycycline for a month budesonide irrigations twice daily see allergy specialist for further evaluation.  Will also get a CT scan in the DICOM mode in the few weeks for possible navigated surgery if needed in the future.  Patient should follow-up in a couple months.      Avery Kwok MD

## 2025-02-25 ENCOUNTER — HOSPITAL ENCOUNTER (OUTPATIENT)
Dept: CT IMAGING | Facility: CLINIC | Age: 80
Discharge: HOME | End: 2025-02-25
Payer: COMMERCIAL

## 2025-02-25 DIAGNOSIS — J18.1 LUNG CONSOLIDATION: ICD-10-CM

## 2025-02-25 DIAGNOSIS — R05.3 CHRONIC COUGH: ICD-10-CM

## 2025-02-25 LAB
CREAT BLD-MCNC: 1 MG/DL (ref 0.5–1)
EGFRCR SERPLBLD CKD-EPI 2021: 58 ML/MIN/1.73M2

## 2025-02-25 PROCEDURE — 250N000009 HC RX 250

## 2025-02-25 PROCEDURE — 250N000011 HC RX IP 250 OP 636

## 2025-02-25 PROCEDURE — 82565 ASSAY OF CREATININE: CPT

## 2025-02-25 PROCEDURE — 71260 CT THORAX DX C+: CPT

## 2025-02-25 RX ORDER — IOPAMIDOL 755 MG/ML
500 INJECTION, SOLUTION INTRAVASCULAR ONCE
Status: COMPLETED | OUTPATIENT
Start: 2025-02-25 | End: 2025-02-25

## 2025-02-25 RX ADMIN — IOPAMIDOL 80 ML: 755 INJECTION, SOLUTION INTRAVENOUS at 14:42

## 2025-02-25 RX ADMIN — SODIUM CHLORIDE 60 ML: 9 INJECTION, SOLUTION INTRAVENOUS at 14:41

## 2025-02-27 ENCOUNTER — TELEPHONE (OUTPATIENT)
Dept: CARDIOLOGY | Facility: CLINIC | Age: 80
End: 2025-02-27

## 2025-02-27 ENCOUNTER — OFFICE VISIT (OUTPATIENT)
Dept: CARDIOLOGY | Facility: CLINIC | Age: 80
End: 2025-02-27
Payer: COMMERCIAL

## 2025-02-27 VITALS
HEIGHT: 60 IN | DIASTOLIC BLOOD PRESSURE: 88 MMHG | OXYGEN SATURATION: 100 % | BODY MASS INDEX: 33.08 KG/M2 | WEIGHT: 168.5 LBS | SYSTOLIC BLOOD PRESSURE: 136 MMHG | HEART RATE: 92 BPM | RESPIRATION RATE: 18 BRPM

## 2025-02-27 DIAGNOSIS — R06.02 SHORTNESS OF BREATH: ICD-10-CM

## 2025-02-27 DIAGNOSIS — I35.0 AORTIC VALVE STENOSIS, ETIOLOGY OF CARDIAC VALVE DISEASE UNSPECIFIED: ICD-10-CM

## 2025-02-27 DIAGNOSIS — Z86.0100 HISTORY OF COLONIC POLYPS: Primary | ICD-10-CM

## 2025-02-27 ASSESSMENT — PAIN SCALES - GENERAL: PAINLEVEL_OUTOF10: NO PAIN (0)

## 2025-02-27 NOTE — LETTER
2/27/2025    Michael Zambrano MD  [address] [facility] [address]    RE: Lianet Mendez       Dear Colleague,     I had the pleasure of seeing Lianet Mendez in the [facility].         Cardiology Consultation    HPI:     This is a 79 yr old female here for sob. She has PMH mild aortic stenosis, melanoma, breast ca s/p mastectomy, no radiation treatment history. She is a non smoker with + fam history on father's side for CHF/CAD. She is here with her daughter who also suspects she has undiagnosed sleep apnea. She was also recently diagnosed with PD, no significant dementia. Pt is adequate historian.    She reports SOB ongoing now for months, with exertion but increasing in frequency and severity. Activities have been limited as a result. Her BP is overall well controlled. She also gets occasional palpitations and occasional chest pressure. No syncope. +mild dizziness.    ASSESSMENT/PLAN:    1. Shortness of breath: differential includes HFpEF (risk factors undiagnosed BERNARD, age, htn), CAD  -check echocardiogram (h/o mild aortic stenosis diagnosed 2023)  -nuclear stress test   -holter monitor to screen for AFIB given palpitations (risk age, bernard)  -sleep study evaluation   -follow up with [facility] in 1-2 months     2. HTN: continue losartan for now, discussed  changing medications based on above diagnoses after studies are complete     3. HLD: LDL currently 123 mg/dL, on statin. May need adjustment of statin if +stress test or concern for coronary artery disease. Will discuss at follow up visit.    Wendy Benavidez MD MSC    Labs and imaging personally reviewed by me today.      PAST MEDICAL HISTORY  Past Medical History:   Diagnosis Date     Breast cancer (H) 1995     Essential hypertension      Melanoma (H) 2019       CURRENT MEDICATIONS  Current Outpatient Medications   Medication Sig Dispense Refill     albuterol (PROAIR HFA/PROVENTIL HFA/VENTOLIN HFA) 108 (90 Base) MCG/ACT inhaler Inhale 2  puffs into the lungs every 6 hours as needed for shortness of breath, wheezing or cough. 18 g 3     atorvastatin (LIPITOR) 20 MG tablet Take 1 tablet (20 mg) by mouth daily. 90 tablet 3     carbidopa-levodopa (SINEMET)  MG tablet Take 1 tablet by mouth 3 times daily. 90 tablet 4     cetirizine (ZYRTEC) 10 MG tablet Take 1 tablet (10 mg) by mouth daily. 90 tablet 3     fluticasone-salmeterol (WIXELA INHUB) 250-50 MCG/ACT inhaler Inhale 1 puff into the lungs 2 times daily. 60 each 2     losartan (COZAAR) 100 MG tablet Take 1 tablet (100 mg) by mouth daily. 90 tablet 3     magnesium 250 MG tablet Take 1 tablet by mouth daily       Melatonin 10 MG TABS tablet Take 10 mg by mouth nightly as needed for sleep       triamterene-HCTZ (DYAZIDE) 37.5-25 MG capsule Take 1 capsule by mouth daily. TAKE ONE CAPSULE BY MOUTH EVERY MORNING 90 capsule 3     vitamin C (ASCORBIC ACID) 1000 MG TABS 1,000 mg.       Zinc 30 MG TABS          PAST SURGICAL HISTORY:  Past Surgical History:   Procedure Laterality Date     BIOPSY  2019     BREAST SURGERY       COLECTOMY      polyps.     COLONOSCOPY N/A 08/12/2019    Procedure: COLONOSCOPY;  Surgeon: Mario Alberto Villanueva MD;  Location:  GI     masectomy       ORTHOPEDIC SURGERY  2020    Hip replacement       ALLERGIES   No Known Allergies    FAMILY HISTORY  Family History   Problem Relation Age of Onset     Osteoporosis Mother      Hypertension Father      Prostate Cancer Father      Colon Cancer Paternal Grandfather        SOCIAL HISTORY  Social History     Socioeconomic History     Marital status: Single     Spouse name: Not on file     Number of children: Not on file     Years of education: Not on file     Highest education level: Not on file   Occupational History     Not on file   Tobacco Use     Smoking status: Never     Smokeless tobacco: Never   Vaping Use     Vaping status: Never Used   Substance and Sexual Activity     Alcohol use: Yes     Comment: Social     Drug use: Never      Sexual activity: Not Currently     Partners: Male     Birth control/protection: Female Surgical   Other Topics Concern     Parent/sibling w/ CABG, MI or angioplasty before 65F 55M? No   Social History Narrative     Not on file     Social Drivers of Health     Financial Resource Strain: Low Risk  (11/18/2024)    Financial Resource Strain      Within the past 12 months, have you or your family members you live with been unable to get utilities (heat, electricity) when it was really needed?: No   Food Insecurity: Low Risk  (11/18/2024)    Food Insecurity      Within the past 12 months, did you worry that your food would run out before you got money to buy more?: No      Within the past 12 months, did the food you bought just not last and you didn t have money to get more?: No   Transportation Needs: Low Risk  (11/18/2024)    Transportation Needs      Within the past 12 months, has lack of transportation kept you from medical appointments, getting your medicines, non-medical meetings or appointments, work, or from getting things that you need?: No   Physical Activity: Insufficiently Active (11/18/2024)    Exercise Vital Sign      Days of Exercise per Week: 1 day      Minutes of Exercise per Session: 10 min   Stress: No Stress Concern Present (11/18/2024)    [employer] - Occupational Stress Questionnaire      Feeling of Stress : Not at all   Social Connections: Unknown (11/18/2024)    Social Connection and Isolation Panel [NHANES]      Frequency of Communication with Friends and Family: Not on file      Frequency of Social Gatherings with Friends and Family: Patient declined      Attends Latter day Services: Not on file      Active Member of Clubs or Organizations: Not on file      Attends Club or Organization Meetings: Not on file      Marital Status: Not on file   Interpersonal Safety: Low Risk  (11/18/2024)    Interpersonal Safety      Do you feel physically and emotionally safe where  "you currently live?: Yes      Within the past 12 months, have you been hit, slapped, kicked or otherwise physically hurt by someone?: No      Within the past 12 months, have you been humiliated or emotionally abused in other ways by your partner or ex-partner?: No   Housing Stability: Low Risk  (11/18/2024)    Housing Stability      Do you have housing? : Yes      Are you worried about losing your housing?: No       ROS:   Constitutional: No fever, chills, or sweats. No weight gain/loss   ENT: No visual disturbance, ear ache, epistaxis, sore throat  Allergies/Immunologic: Negative  Respiratory: No cough, hemoptysia  Cardiovascular: As per HPI  GI: No nausea, vomiting, hematemesis, melena, or hematochezia  : No urinary frequency, dysuria, or hematuria  Integument: Negative  Psychiatric: Negative  Neuro: Negative  Endocrinology: Negative   Musculoskeletal: Negative  Vascular: No walking impairment, claudication, ischemic rest pain or nonhealing wounds    EXAM:  /88 (BP Location: Left arm, Patient Position: Sitting, Cuff Size: Adult Regular)   Pulse 92   Resp 18   Ht 1.514 m (4' 11.6\")   Wt 76.4 kg (168 lb 8 oz)   SpO2 100%   BMI 33.35 kg/m    In general, the patient is a pleasant female in no apparent distress.    HEENT: NC/AT.  PERRLA.  EOMI.  Sclerae white, not injected.    Neck: No adenopathy.  No thyromegaly. Carotids +2/2 bilaterally without bruits.  No jugular venous distension.   Heart: RRR. Normal S1, S2 splits physiologically. No murmur, rub, click, or gallop.   Lungs: CTA.  No ronchi, wheezes, rales.  No dullness to percussion.   Abdomen: Soft, nontender, nondistended. No organomegaly. No AAA.  No bruits.   Extremities: No clubbing, cyanosis, 1+ edema.    Vascular: No bruits are noted.    Labs:  LIPID RESULTS:  Lab Results   Component Value Date    CHOL 225 (H) 11/18/2024    HDL 81 11/18/2024     (H) 11/18/2024    TRIG 107 11/18/2024    NHDL 144 (H) 11/18/2024       LIVER ENZYME " "RESULTS:  Lab Results   Component Value Date    AST 16 11/18/2024    AST 16 05/29/2019    ALT 10 11/18/2024    ALT 16 05/29/2019       CBC RESULTS:  Lab Results   Component Value Date    WBC 6.1 12/03/2021    WBC 6.3 06/18/2019    RBC 4.17 12/03/2021    RBC 4.74 06/18/2019    HGB 11.9 12/03/2021    HGB 13.1 06/18/2019    HCT 35.9 12/03/2021    HCT 40.3 06/18/2019    MCV 86 12/03/2021    MCV 85 06/18/2019    MCH 28.5 12/03/2021    MCH 27.6 06/18/2019    MCHC 33.1 12/03/2021    MCHC 32.5 06/18/2019    RDW 14.6 12/03/2021    RDW 14.5 06/18/2019     12/03/2021     06/18/2019       BMP RESULTS:  Lab Results   Component Value Date     11/18/2024     06/18/2019    POTASSIUM 4.4 11/18/2024    POTASSIUM 4.2 10/18/2022    POTASSIUM 3.9 06/18/2019    CHLORIDE 102 11/18/2024    CHLORIDE 104 10/18/2022    CHLORIDE 104 06/18/2019    CO2 24 11/18/2024    CO2 30 10/18/2022    CO2 29 06/18/2019    ANIONGAP 12 11/18/2024    ANIONGAP 4 10/18/2022    ANIONGAP 7 06/18/2019     (H) 11/18/2024     (H) 10/18/2022    GLC 98 06/18/2019    BUN 38.6 (H) 11/18/2024    BUN 36 (H) 10/18/2022    BUN 25 06/18/2019    CR 1.0 02/25/2025    CR 0.96 (H) 11/18/2024    CR 0.78 06/18/2019    GFRESTIMATED 58 (L) 02/25/2025    GFRESTIMATED 76 06/18/2019    GFRESTBLACK 88 06/18/2019    LIDA 9.8 11/18/2024    LIDA 9.3 06/18/2019        A1C RESULTS:  No results found for: \"A1C\"        Thank you for allowing me to participate in the care of your patient.      Sincerely,     Wendy Benavidez MD     [facility]  cc:   Wendy Benavidez MD  [address] [place] [id number]      "

## 2025-02-27 NOTE — PROGRESS NOTES
Cardiology Consultation    HPI:     This is a 79 yr old female here for sob. She has PMH mild aortic stenosis, melanoma, breast ca s/p mastectomy, no radiation treatment history. She is a non smoker with + fam history on father's side for CHF/CAD. She is here with her daughter who also suspects she has undiagnosed sleep apnea. She was also recently diagnosed with PD, no significant dementia. Pt is adequate historian.    She reports SOB ongoing now for months, with exertion but increasing in frequency and severity. Activities have been limited as a result. Her BP is overall well controlled. She also gets occasional palpitations and occasional chest pressure. No syncope. +mild dizziness.    ASSESSMENT/PLAN:    1. Shortness of breath: differential includes HFpEF (risk factors undiagnosed BERNARD, age, htn), CAD  -check echocardiogram (h/o mild aortic stenosis diagnosed 2023)  -nuclear stress test   -holter monitor to screen for AFIB given palpitations (risk age, bernard)  -sleep study evaluation   -follow up with [facility] in 1-2 months     2. HTN: continue losartan for now, discussed  changing medications based on above diagnoses after studies are complete     3. HLD: LDL currently 123 mg/dL, on statin. May need adjustment of statin if +stress test or concern for coronary artery disease. Will discuss at follow up visit.    Wendy Benavidez MD MSC    Labs and imaging personally reviewed by me today.      PAST MEDICAL HISTORY  Past Medical History:   Diagnosis Date    Breast cancer (H) 1995    Essential hypertension     Melanoma (H) 2019       CURRENT MEDICATIONS  Current Outpatient Medications   Medication Sig Dispense Refill    albuterol (PROAIR HFA/PROVENTIL HFA/VENTOLIN HFA) 108 (90 Base) MCG/ACT inhaler Inhale 2 puffs into the lungs every 6 hours as needed for shortness of breath, wheezing or cough. 18 g 3    atorvastatin (LIPITOR) 20 MG tablet Take 1 tablet (20 mg) by mouth daily. 90 tablet 3    carbidopa-levodopa  (SINEMET)  MG tablet Take 1 tablet by mouth 3 times daily. 90 tablet 4    cetirizine (ZYRTEC) 10 MG tablet Take 1 tablet (10 mg) by mouth daily. 90 tablet 3    fluticasone-salmeterol (WIXELA INHUB) 250-50 MCG/ACT inhaler Inhale 1 puff into the lungs 2 times daily. 60 each 2    losartan (COZAAR) 100 MG tablet Take 1 tablet (100 mg) by mouth daily. 90 tablet 3    magnesium 250 MG tablet Take 1 tablet by mouth daily      Melatonin 10 MG TABS tablet Take 10 mg by mouth nightly as needed for sleep      triamterene-HCTZ (DYAZIDE) 37.5-25 MG capsule Take 1 capsule by mouth daily. TAKE ONE CAPSULE BY MOUTH EVERY MORNING 90 capsule 3    vitamin C (ASCORBIC ACID) 1000 MG TABS 1,000 mg.      Zinc 30 MG TABS          PAST SURGICAL HISTORY:  Past Surgical History:   Procedure Laterality Date    BIOPSY  2019    BREAST SURGERY      COLECTOMY      polyps.    COLONOSCOPY N/A 08/12/2019    Procedure: COLONOSCOPY;  Surgeon: Mario Alberto Villanueva MD;  Location:  GI    masectomy      ORTHOPEDIC SURGERY  2020    Hip replacement       ALLERGIES   No Known Allergies    FAMILY HISTORY  Family History   Problem Relation Age of Onset    Osteoporosis Mother     Hypertension Father     Prostate Cancer Father     Colon Cancer Paternal Grandfather        SOCIAL HISTORY  Social History     Socioeconomic History    Marital status: Single     Spouse name: Not on file    Number of children: Not on file    Years of education: Not on file    Highest education level: Not on file   Occupational History    Not on file   Tobacco Use    Smoking status: Never    Smokeless tobacco: Never   Vaping Use    Vaping status: Never Used   Substance and Sexual Activity    Alcohol use: Yes     Comment: Social    Drug use: Never    Sexual activity: Not Currently     Partners: Male     Birth control/protection: Female Surgical   Other Topics Concern    Parent/sibling w/ CABG, MI or angioplasty before 65F 55M? No   Social History Narrative    Not on file      Social Drivers of Health     Financial Resource Strain: Low Risk  (11/18/2024)    Financial Resource Strain     Within the past 12 months, have you or your family members you live with been unable to get utilities (heat, electricity) when it was really needed?: No   Food Insecurity: Low Risk  (11/18/2024)    Food Insecurity     Within the past 12 months, did you worry that your food would run out before you got money to buy more?: No     Within the past 12 months, did the food you bought just not last and you didn t have money to get more?: No   Transportation Needs: Low Risk  (11/18/2024)    Transportation Needs     Within the past 12 months, has lack of transportation kept you from medical appointments, getting your medicines, non-medical meetings or appointments, work, or from getting things that you need?: No   Physical Activity: Insufficiently Active (11/18/2024)    Exercise Vital Sign     Days of Exercise per Week: 1 day     Minutes of Exercise per Session: 10 min   Stress: No Stress Concern Present (11/18/2024)    [employer] - Occupational Stress Questionnaire     Feeling of Stress : Not at all   Social Connections: Unknown (11/18/2024)    Social Connection and Isolation Panel [NHANES]     Frequency of Communication with Friends and Family: Not on file     Frequency of Social Gatherings with Friends and Family: Patient declined     Attends Buddhist Services: Not on file     Active Member of Clubs or Organizations: Not on file     Attends Club or Organization Meetings: Not on file     Marital Status: Not on file   Interpersonal Safety: Low Risk  (11/18/2024)    Interpersonal Safety     Do you feel physically and emotionally safe where you currently live?: Yes     Within the past 12 months, have you been hit, slapped, kicked or otherwise physically hurt by someone?: No     Within the past 12 months, have you been humiliated or emotionally abused in other ways by your partner  "or ex-partner?: No   Housing Stability: Low Risk  (11/18/2024)    Housing Stability     Do you have housing? : Yes     Are you worried about losing your housing?: No       ROS:   Constitutional: No fever, chills, or sweats. No weight gain/loss   ENT: No visual disturbance, ear ache, epistaxis, sore throat  Allergies/Immunologic: Negative  Respiratory: No cough, hemoptysia  Cardiovascular: As per HPI  GI: No nausea, vomiting, hematemesis, melena, or hematochezia  : No urinary frequency, dysuria, or hematuria  Integument: Negative  Psychiatric: Negative  Neuro: Negative  Endocrinology: Negative   Musculoskeletal: Negative  Vascular: No walking impairment, claudication, ischemic rest pain or nonhealing wounds    EXAM:  /88 (BP Location: Left arm, Patient Position: Sitting, Cuff Size: Adult Regular)   Pulse 92   Resp 18   Ht 1.514 m (4' 11.6\")   Wt 76.4 kg (168 lb 8 oz)   SpO2 100%   BMI 33.35 kg/m    In general, the patient is a pleasant female in no apparent distress.    HEENT: NC/AT.  PERRLA.  EOMI.  Sclerae white, not injected.    Neck: No adenopathy.  No thyromegaly. Carotids +2/2 bilaterally without bruits.  No jugular venous distension.   Heart: RRR. Normal S1, S2 splits physiologically. No murmur, rub, click, or gallop.   Lungs: CTA.  No ronchi, wheezes, rales.  No dullness to percussion.   Abdomen: Soft, nontender, nondistended. No organomegaly. No AAA.  No bruits.   Extremities: No clubbing, cyanosis, 1+ edema.    Vascular: No bruits are noted.    Labs:  LIPID RESULTS:  Lab Results   Component Value Date    CHOL 225 (H) 11/18/2024    HDL 81 11/18/2024     (H) 11/18/2024    TRIG 107 11/18/2024    NHDL 144 (H) 11/18/2024       LIVER ENZYME RESULTS:  Lab Results   Component Value Date    AST 16 11/18/2024    AST 16 05/29/2019    ALT 10 11/18/2024    ALT 16 05/29/2019       CBC RESULTS:  Lab Results   Component Value Date    WBC 6.1 12/03/2021    WBC 6.3 06/18/2019    RBC 4.17 12/03/2021    " "RBC 4.74 06/18/2019    HGB 11.9 12/03/2021    HGB 13.1 06/18/2019    HCT 35.9 12/03/2021    HCT 40.3 06/18/2019    MCV 86 12/03/2021    MCV 85 06/18/2019    MCH 28.5 12/03/2021    MCH 27.6 06/18/2019    MCHC 33.1 12/03/2021    MCHC 32.5 06/18/2019    RDW 14.6 12/03/2021    RDW 14.5 06/18/2019     12/03/2021     06/18/2019       BMP RESULTS:  Lab Results   Component Value Date     11/18/2024     06/18/2019    POTASSIUM 4.4 11/18/2024    POTASSIUM 4.2 10/18/2022    POTASSIUM 3.9 06/18/2019    CHLORIDE 102 11/18/2024    CHLORIDE 104 10/18/2022    CHLORIDE 104 06/18/2019    CO2 24 11/18/2024    CO2 30 10/18/2022    CO2 29 06/18/2019    ANIONGAP 12 11/18/2024    ANIONGAP 4 10/18/2022    ANIONGAP 7 06/18/2019     (H) 11/18/2024     (H) 10/18/2022    GLC 98 06/18/2019    BUN 38.6 (H) 11/18/2024    BUN 36 (H) 10/18/2022    BUN 25 06/18/2019    CR 1.0 02/25/2025    CR 0.96 (H) 11/18/2024    CR 0.78 06/18/2019    GFRESTIMATED 58 (L) 02/25/2025    GFRESTIMATED 76 06/18/2019    GFRESTBLACK 88 06/18/2019    LIDA 9.8 11/18/2024    LIDA 9.3 06/18/2019        A1C RESULTS:  No results found for: \"A1C\"      "

## 2025-02-27 NOTE — PATIENT INSTRUCTIONS
1. Holter monitor today  2. Nuclear stress test   3. Echocardiogram  4. Sleep study referral   5. Follow up [facility] in 1-2 months

## 2025-02-27 NOTE — TELEPHONE ENCOUNTER
1st attempt- Left voicemail for the patient to call back and schedule the following:    Appointment type:  Return Cardiology  Provider:  Dr Benavidez  Return date:  End of March 2025  Additional appointment(s) needed:    Additonal Notes:  Pt needs an [facility] appt- both [place] and [place] are full for months- pt will need to come to [place] or do a video visit based out of [place] for the follow up-    Specialty phone number: [phone]

## 2025-03-04 ENCOUNTER — HOSPITAL ENCOUNTER (OUTPATIENT)
Dept: NUCLEAR MEDICINE | Facility: CLINIC | Age: 80
Setting detail: NUCLEAR MEDICINE
Discharge: HOME | End: 2025-03-04
Attending: INTERNAL MEDICINE
Payer: COMMERCIAL

## 2025-03-04 ENCOUNTER — HOSPITAL ENCOUNTER (OUTPATIENT)
Dept: CARDIOLOGY | Facility: CLINIC | Age: 80
Setting detail: NUCLEAR MEDICINE
Discharge: HOME | End: 2025-03-04
Attending: INTERNAL MEDICINE
Payer: COMMERCIAL

## 2025-03-04 ENCOUNTER — TELEPHONE (OUTPATIENT)
Dept: SLEEP MEDICINE | Facility: CLINIC | Age: 80
End: 2025-03-04

## 2025-03-04 ENCOUNTER — HOSPITAL ENCOUNTER (OUTPATIENT)
Dept: CARDIOLOGY | Facility: CLINIC | Age: 80
Discharge: HOME | End: 2025-03-04
Attending: INTERNAL MEDICINE
Payer: COMMERCIAL

## 2025-03-04 DIAGNOSIS — R06.02 SHORTNESS OF BREATH: ICD-10-CM

## 2025-03-04 DIAGNOSIS — I35.0 AORTIC VALVE STENOSIS, ETIOLOGY OF CARDIAC VALVE DISEASE UNSPECIFIED: ICD-10-CM

## 2025-03-04 LAB
CV STRESS MAX HR HE: 102
LVEF ECHO: NORMAL
RATE PRESSURE PRODUCT: NORMAL
STRESS ECHO BASELINE DIASTOLIC HE: 78
STRESS ECHO BASELINE HR: 86 BPM
STRESS ECHO BASELINE SYSTOLIC BP: 115
STRESS ECHO CALCULATED PERCENT HR: 71 %
STRESS ECHO LAST STRESS DIASTOLIC BP: 82
STRESS ECHO LAST STRESS SYSTOLIC BP: 134
STRESS ECHO TARGET HR: 143

## 2025-03-04 PROCEDURE — 93018 CV STRESS TEST I&R ONLY: CPT | Performed by: INTERNAL MEDICINE

## 2025-03-04 PROCEDURE — A9502 TC99M TETROFOSMIN: HCPCS | Performed by: INTERNAL MEDICINE

## 2025-03-04 PROCEDURE — 93306 TTE W/DOPPLER COMPLETE: CPT | Mod: 26 | Performed by: INTERNAL MEDICINE

## 2025-03-04 PROCEDURE — 343N000001 HC RX 343 MED OP 636: Performed by: INTERNAL MEDICINE

## 2025-03-04 PROCEDURE — 93016 CV STRESS TEST SUPVJ ONLY: CPT | Performed by: INTERNAL MEDICINE

## 2025-03-04 PROCEDURE — 78452 HT MUSCLE IMAGE SPECT MULT: CPT

## 2025-03-04 PROCEDURE — 250N000011 HC RX IP 250 OP 636: Performed by: INTERNAL MEDICINE

## 2025-03-04 PROCEDURE — 93017 CV STRESS TEST TRACING ONLY: CPT

## 2025-03-04 PROCEDURE — 78452 HT MUSCLE IMAGE SPECT MULT: CPT | Mod: 26 | Performed by: INTERNAL MEDICINE

## 2025-03-04 PROCEDURE — 999N000208 ECHOCARDIOGRAM COMPLETE

## 2025-03-04 PROCEDURE — 255N000002 HC RX 255 OP 636: Performed by: INTERNAL MEDICINE

## 2025-03-04 RX ORDER — REGADENOSON 0.08 MG/ML
0.4 INJECTION, SOLUTION INTRAVENOUS ONCE
Status: COMPLETED | OUTPATIENT
Start: 2025-03-04 | End: 2025-03-04

## 2025-03-04 RX ADMIN — HUMAN ALBUMIN MICROSPHERES AND PERFLUTREN 5 ML: 10; .22 INJECTION, SOLUTION INTRAVENOUS at 10:42

## 2025-03-04 RX ADMIN — TETROFOSMIN 32.5 MILLICURIE: 1.38 INJECTION, POWDER, LYOPHILIZED, FOR SOLUTION INTRAVENOUS at 09:40

## 2025-03-04 RX ADMIN — REGADENOSON 0.4 MG: 0.08 INJECTION, SOLUTION INTRAVENOUS at 09:52

## 2025-03-04 RX ADMIN — TETROFOSMIN 10 MILLICURIE: 1.38 INJECTION, POWDER, LYOPHILIZED, FOR SOLUTION INTRAVENOUS at 08:28

## 2025-03-04 NOTE — TELEPHONE ENCOUNTER
Reason for Call:  Appointment Request    Patient requesting this type of appt:  sleep consult    Requested provider: rey Benavidez cardio    Reason patient unable to be scheduled: Not within requested timeframe    When does patient want to be seen/preferred time: 1-2 weeks    Comments: priority    Could we send this information to you in [facility] or would you prefer to receive a phone call?:   Patient would prefer a phone call   Okay to leave a detailed message?: Yes at Cell number on file:    Telephone Information:   Mobile [phone]       Call taken on 3/4/2025 at 1:35 PM by Madeline Butterfield

## 2025-03-05 NOTE — TELEPHONE ENCOUNTER
Chart reviewed. Referral is for priority consult. Patient is scheduled first available appointment and moved to high priority wait list. Clinic will reach out if sooner appointment becomes available.     Meghann JAIME RN  [facility]

## 2025-03-06 ENCOUNTER — OFFICE VISIT (OUTPATIENT)
Dept: OTOLARYNGOLOGY | Facility: CLINIC | Age: 80
End: 2025-03-06
Payer: COMMERCIAL

## 2025-03-06 ENCOUNTER — ANCILLARY ORDERS (OUTPATIENT)
Dept: PULMONOLOGY | Facility: CLINIC | Age: 80
End: 2025-03-06

## 2025-03-06 VITALS
HEIGHT: 60 IN | WEIGHT: 168 LBS | SYSTOLIC BLOOD PRESSURE: 118 MMHG | DIASTOLIC BLOOD PRESSURE: 70 MMHG | TEMPERATURE: 97.1 F | BODY MASS INDEX: 32.98 KG/M2

## 2025-03-06 DIAGNOSIS — R59.1 LYMPHADENOPATHY: Primary | ICD-10-CM

## 2025-03-06 DIAGNOSIS — J32.9 SINUSITIS WITH NASAL POLYPS: Primary | ICD-10-CM

## 2025-03-06 DIAGNOSIS — R09.82 POST-NASAL DRIP: ICD-10-CM

## 2025-03-06 DIAGNOSIS — H61.23 BILATERAL IMPACTED CERUMEN: ICD-10-CM

## 2025-03-06 DIAGNOSIS — R92.8 OTHER ABNORMAL AND INCONCLUSIVE FINDINGS ON DIAGNOSTIC IMAGING OF BREAST: ICD-10-CM

## 2025-03-06 DIAGNOSIS — J33.9 SINUSITIS WITH NASAL POLYPS: Primary | ICD-10-CM

## 2025-03-06 RX ORDER — DOXYCYCLINE 100 MG/1
100 CAPSULE ORAL 2 TIMES DAILY
Qty: 70 CAPSULE | Refills: 0 | Status: SHIPPED | OUTPATIENT
Start: 2025-03-06

## 2025-03-06 RX ORDER — BUDESONIDE 0.5 MG/2ML
INHALANT ORAL
Qty: 120 ML | Refills: 1 | Status: SHIPPED | OUTPATIENT
Start: 2025-03-06

## 2025-03-06 ASSESSMENT — PAIN SCALES - GENERAL: PAINLEVEL_OUTOF10: NO PAIN (0)

## 2025-03-06 NOTE — LETTER
3/6/2025      Lianet Mendez  [id number]  [facility] [address]      Dear Colleague,    Thank you for referring your patient, Lianet Mendez, to the [facility]. Please see a copy of my visit note below.    ENT Consultation    Lianet Mendez who is a 79 year old female seen in consultation at the request of Dr. Michael Zambrano.      History of Present Illness - Lianet Mendez is a 79 year old female patient presents with feeling of plugged ears has had issues with a lot of cerumen impaction that was difficult to remove.  The second main problem is for the last few years she has had a lot of facial pressure in the maxillary and ethmoid areas nasal congestion clear to yellowish and greenish rhinorrhea off-and-on has been on several antibiotics for acute sinusitis this winter with some relief.  Sense of smell has gradually decreased sense of taste appears to be intact.  Denies any known history of seasonal or perennial environmental allergies.  She tried cetirizine and Flonase without much relief.      Body mass index is 33.25 kg/m .        BP Readings from Last 1 Encounters:   03/06/25 118/70       BP noted to be well controlled today in office.         Past Medical History -   Past Medical History:   Diagnosis Date     Breast cancer (H) 1995     Essential hypertension      Melanoma (H) 2019       Current Medications -   Current Outpatient Medications:      albuterol (PROAIR HFA/PROVENTIL HFA/VENTOLIN HFA) 108 (90 Base) MCG/ACT inhaler, Inhale 2 puffs into the lungs every 6 hours as needed for shortness of breath, wheezing or cough., Disp: 18 g, Rfl: 3     atorvastatin (LIPITOR) 20 MG tablet, Take 1 tablet (20 mg) by mouth daily., Disp: 90 tablet, Rfl: 3     carbidopa-levodopa (SINEMET)  MG tablet, Take 1.5 tablets by mouth 3 times daily., Disp: 135 tablet, Rfl: 4     cetirizine (ZYRTEC) 10 MG tablet, Take 1 tablet (10 mg) by mouth daily., Disp: 90 tablet, Rfl: 3      fluticasone-salmeterol (WIXELA INHUB) 250-50 MCG/ACT inhaler, Inhale 1 puff into the lungs 2 times daily., Disp: 60 each, Rfl: 2     losartan (COZAAR) 100 MG tablet, Take 1 tablet (100 mg) by mouth daily., Disp: 90 tablet, Rfl: 3     magnesium 250 MG tablet, Take 1 tablet by mouth daily, Disp: , Rfl:      Melatonin 10 MG TABS tablet, Take 10 mg by mouth nightly as needed for sleep, Disp: , Rfl:      triamterene-HCTZ (DYAZIDE) 37.5-25 MG capsule, Take 1 capsule by mouth daily. TAKE ONE CAPSULE BY MOUTH EVERY MORNING, Disp: 90 capsule, Rfl: 3     vitamin C (ASCORBIC ACID) 1000 MG TABS, 1,000 mg., Disp: , Rfl:      Zinc 30 MG TABS, , Disp: , Rfl:     Allergies - No Known Allergies    Social History -   Social History     Socioeconomic History     Marital status: Single   Tobacco Use     Smoking status: Never     Smokeless tobacco: Never   Vaping Use     Vaping status: Never Used   Substance and Sexual Activity     Alcohol use: Yes     Comment: Social     Drug use: Never     Sexual activity: Not Currently     Partners: Male     Birth control/protection: Female Surgical   Other Topics Concern     Parent/sibling w/ CABG, MI or angioplasty before 65F 55M? No     Social Drivers of Health     Financial Resource Strain: Low Risk  (11/18/2024)    Financial Resource Strain      Within the past 12 months, have you or your family members you live with been unable to get utilities (heat, electricity) when it was really needed?: No   Food Insecurity: Low Risk  (11/18/2024)    Food Insecurity      Within the past 12 months, did you worry that your food would run out before you got money to buy more?: No      Within the past 12 months, did the food you bought just not last and you didn t have money to get more?: No   Transportation Needs: Low Risk  (11/18/2024)    Transportation Needs      Within the past 12 months, has lack of transportation kept you from medical appointments, getting your medicines, non-medical meetings or  "appointments, work, or from getting things that you need?: No   Physical Activity: Insufficiently Active (11/18/2024)    Exercise Vital Sign      Days of Exercise per Week: 1 day      Minutes of Exercise per Session: 10 min   Stress: No Stress Concern Present (11/18/2024)    [employer] - Occupational Stress Questionnaire      Feeling of Stress : Not at all   Social Connections: Unknown (11/18/2024)    Social Connection and Isolation Panel [NHANES]      Frequency of Social Gatherings with Friends and Family: Patient declined   Interpersonal Safety: Low Risk  (11/18/2024)    Interpersonal Safety      Do you feel physically and emotionally safe where you currently live?: Yes      Within the past 12 months, have you been hit, slapped, kicked or otherwise physically hurt by someone?: No      Within the past 12 months, have you been humiliated or emotionally abused in other ways by your partner or ex-partner?: No   Housing Stability: Low Risk  (11/18/2024)    Housing Stability      Do you have housing? : Yes      Are you worried about losing your housing?: No       Family History -   Family History   Problem Relation Age of Onset     Osteoporosis Mother      Hypertension Father      Prostate Cancer Father      Colon Cancer Paternal Grandfather        Review of Systems - As per HPI and PMHx, otherwise review of system review of the head and neck negative. Otherwise 10+ review of system is negative    Physical Exam  /70 (BP Location: Right arm, Cuff Size: Adult Regular)   Temp 97.1  F (36.2  C) (Temporal)   Ht 1.514 m (4' 11.6\")   Wt 76.2 kg (168 lb)   BMI 33.25 kg/m    BMI: Body mass index is 33.25 kg/m .    General - The patient is well nourished and well developed, and appears to have good nutritional status.  Alert and oriented to person and place, answers questions and cooperates with examination appropriately.    SKIN - No suspicious lesions or rashes.  Respiration - No " respiratory distress.  Head and Face - Normocephalic and atraumatic, with no gross asymmetry noted of the contour of the facial features.  The facial nerve is intact, with strong symmetric movements.    Voice and Breathing - The patient was breathing comfortably without the use of accessory muscles. The patients voice was clear and strong, and had appropriate pitch and quality.    Ears - Bilateral pinna and EACs with normal appearing overlying skin. Tympanic membrane intact with good mobility on pneumatic otoscopy bilaterally. Bony landmarks of the ossicular chain are normal. The tympanic membranes are normal in appearance. No retraction, perforation, or masses.  No fluid or purulence was seen in the external canal or the middle ear.  Exam was after cerumen disimpaction which made her hear better subjectively.    Eyes - Extraocular movements intact.  Sclera were not icteric or injected, conjunctiva were pink and moist.    Mouth - Examination of the oral cavity showed pink, healthy oral mucosa. No lesions or ulcerations noted.  The tongue was mobile and midline, and the dentition were in good condition.      Throat - The walls of the oropharynx were smooth, pink, moist, symmetric, and had no lesions or ulcerations.  The tonsillar pillars and soft palate were symmetric.  The uvula was midline on elevation.    Neck - Normal midline excursion of the laryngotracheal complex during swallowing.  Full range of motion on passive movement.  Palpation of the occipital, submental, submandibular, internal jugular chain, and supraclavicular nodes did not demonstrate any abnormal lymph nodes or masses.  The carotid pulse was palpable bilaterally.  Palpation of the thyroid was soft and smooth, with no nodules or goiter appreciated.  The trachea was mobile and midline.    Nose - External contour is symmetric, no gross deflection or scars.  Nasal mucosa is pink and moist with no abnormal mucus.  The septum was midline and  non-obstructive, turbinates of normal size and position.  No polyps, masses, or purulence noted on examination.    Neuro - Nonfocal neuro exam is normal, CN 2 through 12 intact, normal gait and muscle tone.      Performed in clinic today:  To further evaluate the nasal cavity, I performed rigid nasal endoscopy.  I first sprayed the nasal cavity bilaterally with a mix of lidocaine and neosynephrine.  I then began on the left side using a 2.7mm, 30 degree rigid nasal endoscope.  The septum was straight and the nasal airway was obstructed.  No abnormal secretions, purulence, or polyps were noted. The left middle turbinate and middle meatus were clearly visualized and large amount of polypoid material was noted in each middle meatus.  Looking up, the olfactory cleft was unobstructed.  Going further back, the sphenoethmoid recess was normal in appearance, with healthy appearing mucosa on the face of the sphenoid.  The nasopharynx was unremarkable, and the eustachian tube opening on this side was unobstructed.    I then turned my attention to the right side.  Once again, the septum was straight, and the airway was obstructed.  No abnormal secretions, purulence, polyps were noted.  The right middle turbinate and middle meatus were clearly visualized and large amount of polypoid material was noted in the middle meatus with inflammation.  Looking up, the olfactory cleft was unobstructed.  Going further back the right sphenoethmoid recess was normal in appearance, and eustachian tube opening was unobstructed.   [id number]      A/P - Lianet Mendez is a 79 year old female patient with successful disimpaction of cerumen today.  The patient has what appears to be sinus polyps and chronic sinusitis.  At this point we will put on doxycycline for a month budesonide irrigations twice daily see allergy specialist for further evaluation.  Will also get a CT scan in the DICOM mode in the few weeks for possible navigated  surgery if needed in the future.  Patient should follow-up in a couple months.      Avery Kwok MD       Again, thank you for allowing me to participate in the care of your patient.        Sincerely,        Avery Kwok MD, MD    Electronically signed

## 2025-03-10 ENCOUNTER — TELEPHONE (OUTPATIENT)
Dept: PLASTIC SURGERY | Facility: CLINIC | Age: 80
End: 2025-03-10
Payer: COMMERCIAL

## 2025-03-10 NOTE — TELEPHONE ENCOUNTER
Patient confirmed scheduled appointment:  Date: 03/18/25  Time: 1115 am   Visit type: New Plastic Surgery  Provider:    Location: [facility]   Testing/imaging: n/a  Additional notes: Pt rescheduled from 03/11/2025

## 2025-03-10 NOTE — TELEPHONE ENCOUNTER
FUTURE VISIT INFORMATION      FUTURE VISIT INFORMATION:  Date: 3/11/25  Time: 8:30am  [place] [facility] [place] INFORMATION:  Referring provider:  Cj Malloy   Referring providers clinic:  [facility]  Reason for visit/diagnosis  Rupture of implant of right breast     RECORDS REQUESTED FROM:       Clinic name Comments Records Status Imaging Status   [facility] MyChart/Referral 3/5/25 [facility]    Imaging US Breast 3/19/25  MA 3/19/25 [facility] PAC

## 2025-03-11 ENCOUNTER — PRE VISIT (OUTPATIENT)
Dept: PLASTIC SURGERY | Facility: CLINIC | Age: 80
End: 2025-03-11
Payer: COMMERCIAL

## 2025-03-11 NOTE — TELEPHONE ENCOUNTER
FUTURE VISIT INFORMATION      FUTURE VISIT INFORMATION:  Date: 3/18/25  Time: 11:15am  [place] [facility] [place]  REFERRAL INFORMATION:  Referring provider:  Cj Malloy   Referring providers clinic:  [facility]  Reason for visit/diagnosis  Rupture of implant of right breast      RECORDS REQUESTED FROM:         Clinic name Comments Records Status Imaging Status   [facility] MyChart/Referral 3/5/25 [facility]     Imaging US Breast 3/19/25  MA 3/19/25 [facility] PAC

## 2025-03-18 ENCOUNTER — PRE VISIT (OUTPATIENT)
Dept: PLASTIC SURGERY | Facility: CLINIC | Age: 80
End: 2025-03-18
Payer: COMMERCIAL

## 2025-03-19 ENCOUNTER — HOSPITAL ENCOUNTER (OUTPATIENT)
Dept: ULTRASOUND IMAGING | Facility: CLINIC | Age: 80
Discharge: HOME | End: 2025-03-19
Payer: COMMERCIAL

## 2025-03-19 ENCOUNTER — HOSPITAL ENCOUNTER (OUTPATIENT)
Dept: MAMMOGRAPHY | Facility: CLINIC | Age: 80
Discharge: HOME | End: 2025-03-19
Payer: COMMERCIAL

## 2025-03-19 DIAGNOSIS — R92.8 OTHER ABNORMAL AND INCONCLUSIVE FINDINGS ON DIAGNOSTIC IMAGING OF BREAST: ICD-10-CM

## 2025-03-19 DIAGNOSIS — R59.1 LYMPHADENOPATHY: ICD-10-CM

## 2025-03-19 PROCEDURE — 77065 DX MAMMO INCL CAD UNI: CPT | Mod: LT

## 2025-03-19 PROCEDURE — 77061 BREAST TOMOSYNTHESIS UNI: CPT | Mod: LT

## 2025-03-19 PROCEDURE — 76641 ULTRASOUND BREAST COMPLETE: CPT | Mod: RT

## 2025-03-19 NOTE — PROGRESS NOTES
Cardiology Clinic Follow up     Lianet Mendez MRN# [id number]   YOB: 1945 Age: 79 year old     Primary cardiologist: Dr. Benavidez     Reason for visit: Follow up cardiac testing     History of presenting illness:    Lianet Mendez is a pleasant 79 year old female with past medical history significant for mild aortic stenosis, melanoma, breast cancer s/p mastectomy with reconstruction (no radiation history) and parkinson's disease who recently underwent cardiac evaluation for dyspnea on exertion. Referred for Echocardiogram, Nuc stress test, Holter and sleep study. Presents today for follow up of testing.     Today, Lianet presents for follow up.  Has seen pulmonary due to cough and dyspnea. She had pulmonary function testing with normal spirometry and diffusing capacity. They did start an inhaler and her cough is significantly improved. No further wheezing. She also saw ENT.  She had CT and found possible lymph nodes and her implant had ruptured. She still has dyspnea with exertion with long distance walking or incline. She recovers quickly with rest break. She can do 20 minute intervals on the stationary bike. No dizziness, occasional lightheaded with nasal congestion, no palpitations, chest pain, no increased leg swelling. Is salt sensitive.             Assessment and Plan:     ASSESSMENT:    Dyspnea on exertion  -Unclear etiology, worse with longer distance or incline  -Echocardiogram shows only mild aortic stenosis and AI with normal LVEF 65-70%. Holter with sinus rhythm with SVE's. Nuc stress testing was negative for ischemia or infarct. Hyperdynamic.   -Awaiting sleep study   -Has underwent pulmonary evaluation and on inhaler which has helped her cough. Meeting with general surgery after findings of implant rupture on CT scan.  -Check updated CBC to ensure no anemia   -Ensure hypertension remains well controlled on home recheck    Mild aortic stenosis  -Follow on surveillance Echo  "again in 2 years     PACs  -Asymptomatic. Consider beta blocker in future as needed  HTN  -Mildly above goal today. Long drive to clinic.  -Continue losartan 100mg and dyazide 37.5-25mg daily for now    Hyperlipidemia   -Update fasting lipids after starting atorvastatin 20mg daily. Goal LDL < 100, adjust dose as needed        PLAN:     Repeat fasting lipids when able  Check updated CBC   Follow up with sleep study  She is planning to move to [place] soon so will not schedule follow up. Encouraged her to establish care with [place] cardiologist in future         iCra Flowers, DNP, APRN, CNP  [facility] - [place]     Orders this Visit:  Orders Placed This Encounter   Procedures    Lipid panel reflex to direct LDL Fasting    CBC with platelets    N terminal pro BNP outpatient     No orders of the defined types were placed in this encounter.    There are no discontinued medications.    Today's clinic visit entailed:  Review of external notes as documented elsewhere in note  Review of the result(s) of each unique test - Echo, Nuc, Holter  Ordering of each unique test  Prescription drug management  The level of medical decision making during this visit was of moderate complexity.           Physical Exam:   Vitals: BP (!) 141/84   Pulse 90   Ht 1.511 m (4' 11.5\")   Wt 77.1 kg (170 lb)   SpO2 100%   BMI 33.76 kg/m      Body mass index is 33.76 kg/m .  Wt Readings from Last 3 Encounters:   03/21/25 77.1 kg (170 lb)   03/06/25 76.2 kg (168 lb)   02/27/25 76.4 kg (168 lb 8 oz)        General :   Alert and oriented, in no acute distress.    Respiratory:   Respirations unlabored. Clear bilaterally with no rales, rhonchi, or wheezes.     Cardiovascular:   Rhythm is regular. S1 and S2 are normal. +systolic murmur    Extremities: Warm and dry, trace lower edema   Neurologic: Moves all extremities, non focal    Psych:  Appropriate             Medications:     Current Outpatient Medications   Medication Sig " Dispense Refill    albuterol (PROAIR HFA/PROVENTIL HFA/VENTOLIN HFA) 108 (90 Base) MCG/ACT inhaler Inhale 2 puffs into the lungs every 6 hours as needed for shortness of breath, wheezing or cough. 18 g 3    atorvastatin (LIPITOR) 20 MG tablet Take 1 tablet (20 mg) by mouth daily. 90 tablet 3    budesonide (PULMICORT) 0.5 MG/2ML neb solution Mix respule of 0.5mg/2 mL budesonide vial in 8oz normal saline sinus rinse bottle. Irrigate each nostril with half of the bottle twice daily 120 mL 1    carbidopa-levodopa (SINEMET)  MG tablet Take 1.5 tablets by mouth 3 times daily. 135 tablet 4    cetirizine (ZYRTEC) 10 MG tablet Take 1 tablet (10 mg) by mouth daily. 90 tablet 3    doxycycline hyclate (VIBRAMYCIN) 100 MG capsule Take 1 capsule (100 mg) by mouth 2 times daily. 70 capsule 0    fluticasone-salmeterol (WIXELA INHUB) 250-50 MCG/ACT inhaler Inhale 1 puff into the lungs 2 times daily. 60 each 2    losartan (COZAAR) 100 MG tablet Take 1 tablet (100 mg) by mouth daily. 90 tablet 3    magnesium 250 MG tablet Take 1 tablet by mouth daily      Melatonin 10 MG TABS tablet Take 10 mg by mouth nightly as needed for sleep      triamterene-HCTZ (DYAZIDE) 37.5-25 MG capsule Take 1 capsule by mouth daily. TAKE ONE CAPSULE BY MOUTH EVERY MORNING 90 capsule 3    vitamin C (ASCORBIC ACID) 1000 MG TABS 1,000 mg.      Zinc 30 MG TABS          Family History   Problem Relation Age of Onset    Osteoporosis Mother     Hypertension Father     Prostate Cancer Father     Colon Cancer Paternal Grandfather        Social History     Socioeconomic History    Marital status: Single     Spouse name: Not on file    Number of children: Not on file    Years of education: Not on file    Highest education level: Not on file   Occupational History    Not on file   Tobacco Use    Smoking status: Never    Smokeless tobacco: Never   Vaping Use    Vaping status: Never Used   Substance and Sexual Activity    Alcohol use: Yes     Comment: Social     Drug use: Never    Sexual activity: Not Currently     Partners: Male     Birth control/protection: Female Surgical   Other Topics Concern    Parent/sibling w/ CABG, MI or angioplasty before 65F 55M? No   Social History Narrative    Not on file     Social Drivers of Health     Financial Resource Strain: Low Risk  (11/18/2024)    Financial Resource Strain     Within the past 12 months, have you or your family members you live with been unable to get utilities (heat, electricity) when it was really needed?: No   Food Insecurity: Low Risk  (11/18/2024)    Food Insecurity     Within the past 12 months, did you worry that your food would run out before you got money to buy more?: No     Within the past 12 months, did the food you bought just not last and you didn t have money to get more?: No   Transportation Needs: Low Risk  (11/18/2024)    Transportation Needs     Within the past 12 months, has lack of transportation kept you from medical appointments, getting your medicines, non-medical meetings or appointments, work, or from getting things that you need?: No   Physical Activity: Insufficiently Active (11/18/2024)    Exercise Vital Sign     Days of Exercise per Week: 1 day     Minutes of Exercise per Session: 10 min   Stress: No Stress Concern Present (11/18/2024)    [employer] - Occupational Stress Questionnaire     Feeling of Stress : Not at all   Social Connections: Unknown (11/18/2024)    Social Connection and Isolation Panel [NHANES]     Frequency of Communication with Friends and Family: Not on file     Frequency of Social Gatherings with Friends and Family: Patient declined     Attends Yazidi Services: Not on file     Active Member of Clubs or Organizations: Not on file     Attends Club or Organization Meetings: Not on file     Marital Status: Not on file   Interpersonal Safety: Low Risk  (11/18/2024)    Interpersonal Safety     Do you feel physically and emotionally safe where  you currently live?: Yes     Within the past 12 months, have you been hit, slapped, kicked or otherwise physically hurt by someone?: No     Within the past 12 months, have you been humiliated or emotionally abused in other ways by your partner or ex-partner?: No   Housing Stability: Low Risk  (11/18/2024)    Housing Stability     Do you have housing? : Yes     Are you worried about losing your housing?: No          Past Medical History:     Past Medical History:   Diagnosis Date    Breast cancer (H) 1995    Essential hypertension     Melanoma (H) 2019            Past Surgical History:     Past Surgical History:   Procedure Laterality Date    BIOPSY  2019    BREAST SURGERY      COLECTOMY      polyps.    COLONOSCOPY N/A 08/12/2019    Procedure: COLONOSCOPY;  Surgeon: Mario Alberto Villanueva MD;  Location:  GI    [facility]      ORTHOPEDIC SURGERY  2020    Hip replacement            Allergies:   Patient has no known allergies.       Data Reviewed today:   Most Recent Echocardiogram: 3/4/2025  Summary  Left ventricular systolic function is normal.  The visual ejection fraction is 65-70%.  No regional wall motion abnormalities noted.  Mild valvular aortic stenosis.  The mean AoV pressure gradient is 11mmHg.  There is mild (1+) aortic regurgitation.  Compared to echo from September 2023, the gradients across aortic valve are  unchanged. Aortic valve area was underestimated on prior study due to  underestimation of the LVOT diameter. The study was technically difficult.  _________________________________________________________________________    Stress testing: 3/4/2025    The nuclear stress test is negative for inducible myocardial ischemia or infarction.    Left ventricular function is hyperdynamic.    The left ventricular ejection fraction at rest is greater than 70%. The left ventricular ejection fraction at stress is greater than 70%.    TID is present quantitatively but not visually.    There is no prior study for  comparison.    Holter: 2/2025  1. Lianet Mendez was monitored for 48:00 hours. The overall quality of the tracing was fair. The principle rhythm was sinus. During this time the average heart rate was 88 bpm. The minimum heart rate was 66 bpm at 3:34 AM and the maximum heart rate was 121 bpm at 8:32 AM. There were zero pauses greater than 2.0 seconds. The TN interval measured .12-.14 seconds, the QRS duration .08 seconds, the QT interval .27-.40 seconds.  2. There were 8 isolated ventricular ectopics.  3. There were 204 isolated SVEs, 8 couplets, 2 triplets and 4 runs. The longest run was 6 beats at 8:19 AM. The fastest run was 4 beats with a rate of 153 bpm at 8:25 AM.  4. No events were logged during the recording period.    All laboratory data reviewed:    Recent Labs   Lab Test 11/18/24  1333 07/28/23  0902 10/18/22  1459 09/27/21  1052 05/29/19  1550   * 119* 118*   < >  --    HDL 81 66 73   < >  --    NHDL 144* 150* 157*   < >  --    CHOL 225* 216* 230*   < >  --    TRIG 107 153* 197*   < >  --    TSH  --   --   --   --  0.76    < > = values in this interval not displayed.       Lab Results   Component Value Date    WBC 6.1 12/03/2021    WBC 6.3 06/18/2019    RBC 4.17 12/03/2021    RBC 4.74 06/18/2019    HGB 11.9 12/03/2021    HGB 13.1 06/18/2019    HCT 35.9 12/03/2021    HCT 40.3 06/18/2019    MCV 86 12/03/2021    MCV 85 06/18/2019    MCH 28.5 12/03/2021    MCH 27.6 06/18/2019    MCHC 33.1 12/03/2021    MCHC 32.5 06/18/2019    RDW 14.6 12/03/2021    RDW 14.5 06/18/2019     12/03/2021     06/18/2019       Lab Results   Component Value Date     11/18/2024     06/18/2019    POTASSIUM 4.4 11/18/2024    POTASSIUM 4.2 10/18/2022    POTASSIUM 3.9 06/18/2019    CHLORIDE 102 11/18/2024    CHLORIDE 104 10/18/2022    CHLORIDE 104 06/18/2019    CO2 24 11/18/2024    CO2 30 10/18/2022    CO2 29 06/18/2019    ANIONGAP 12 11/18/2024    ANIONGAP 4 10/18/2022    ANIONGAP 7 06/18/2019      "(H) 11/18/2024     (H) 10/18/2022    GLC 98 06/18/2019    BUN 38.6 (H) 11/18/2024    BUN 36 (H) 10/18/2022    BUN 25 06/18/2019    CR 1.0 02/25/2025    CR 0.96 (H) 11/18/2024    CR 0.78 06/18/2019    GFRESTIMATED 58 (L) 02/25/2025    GFRESTIMATED 76 06/18/2019    GFRESTBLACK 88 06/18/2019    LIDA 9.8 11/18/2024    LIDA 9.3 06/18/2019      Lab Results   Component Value Date    AST 16 11/18/2024    AST 16 05/29/2019    ALT 10 11/18/2024    ALT 16 05/29/2019       No results found for: \"A1C\"    The longitudinal plan of care for Lianet was addressed during this visit. Due to the added complexity in care, I will continue to support Lianet in the subsequent management of this condition(s) and with the ongoing continuity of care of this condition(s).    This note has been dictated using voice recognition software. Any grammatical, typographical, or context distortions are unintentional and inherent to the software.  "

## 2025-03-21 ENCOUNTER — OFFICE VISIT (OUTPATIENT)
Dept: CARDIOLOGY | Facility: CLINIC | Age: 80
End: 2025-03-21
Payer: COMMERCIAL

## 2025-03-21 VITALS
BODY MASS INDEX: 33.38 KG/M2 | WEIGHT: 170 LBS | SYSTOLIC BLOOD PRESSURE: 141 MMHG | HEART RATE: 90 BPM | DIASTOLIC BLOOD PRESSURE: 84 MMHG | OXYGEN SATURATION: 100 % | HEIGHT: 60 IN

## 2025-03-21 DIAGNOSIS — E78.5 HYPERLIPIDEMIA LDL GOAL <100: ICD-10-CM

## 2025-03-21 DIAGNOSIS — R06.09 DYSPNEA ON EXERTION: Primary | ICD-10-CM

## 2025-03-21 DIAGNOSIS — I10 PRIMARY HYPERTENSION: ICD-10-CM

## 2025-03-21 PROCEDURE — 3077F SYST BP >= 140 MM HG: CPT | Performed by: NURSE PRACTITIONER

## 2025-03-21 PROCEDURE — G2211 COMPLEX E/M VISIT ADD ON: HCPCS | Performed by: NURSE PRACTITIONER

## 2025-03-21 PROCEDURE — 3079F DIAST BP 80-89 MM HG: CPT | Performed by: NURSE PRACTITIONER

## 2025-03-21 PROCEDURE — 99214 OFFICE O/P EST MOD 30 MIN: CPT | Performed by: NURSE PRACTITIONER

## 2025-03-21 NOTE — PATIENT INSTRUCTIONS
It was nice to meet you.    Thank you for your visit with the [facility] today.    Recommendations discussed today:     Aortic valve remains mild stenosis. We will monitor this periodically every 2 years. Normal pumping function of heart.    Stress test was reassuringly with no signs of significant blockage     Heart monitor showed some extra heart beats that are benign. No evidence of arrhythmias. If you get palpitations we could add a beta blocker medicine if needed.     If blood pressures are consistently > 140's then we can adjust medication as needed    Keep plan for sleep study     Labs:  - Repeat fasting cholesterol when able and we will check a CBC to make sure no anemia and another lab test BNP that looks at stretch of heart cells. Goal LDL < 100.     Follow up plan:   - 6-12 months plan to establish with Cardiology in [place].   - Check with insurance company for mail order pharmacy options ([facility] mail order). Or transfer prescriptions to a national pharmacy like [facility] or [facility] ahead of time and then the pharmacy will transfer to [place].         If you have questions or concerns in the meantime please call my nurse team at [phone] or send a [employer] message for non urgent requests.     ________________________________    SAHARA Hernandez, CNP    [facility]

## 2025-03-21 NOTE — LETTER
3/21/2025    Michael Zambrano MD  [address] [facility] [address]    RE: Lianet Mendez       Dear Colleague,     I had the pleasure of seeing Lianet Mendez in the [facility].        Cardiology Clinic Follow up     Lianet Mendez MRN# [id number]   YOB: 1945 Age: 79 year old     Primary cardiologist: Dr. Benavidez     Reason for visit: Follow up cardiac testing     History of presenting illness:    Lianet Mendez is a pleasant 79 year old female with past medical history significant for mild aortic stenosis, melanoma, breast cancer s/p mastectomy with reconstruction (no radiation history) and parkinson's disease who recently underwent cardiac evaluation for dyspnea on exertion. Referred for Echocardiogram, Nuc stress test, Holter and sleep study. Presents today for follow up of testing.     Today, Lianet presents for follow up.  Has seen pulmonary due to cough and dyspnea. She had pulmonary function testing with normal spirometry and diffusing capacity. They did start an inhaler and her cough is significantly improved. No further wheezing. She also saw ENT.  She had CT and found possible lymph nodes and her implant had ruptured. She still has dyspnea with exertion with long distance walking or incline. She recovers quickly with rest break. She can do 20 minute intervals on the stationary bike. No dizziness, occasional lightheaded with nasal congestion, no palpitations, chest pain, no increased leg swelling. Is salt sensitive.             Assessment and Plan:     ASSESSMENT:    Dyspnea on exertion  -Unclear etiology, worse with longer distance or incline  -Echocardiogram shows only mild aortic stenosis and AI with normal LVEF 65-70%. Holter with sinus rhythm with SVE's. Nuc stress testing was negative for ischemia or infarct. Hyperdynamic.   -Awaiting sleep study   -Has underwent pulmonary evaluation and on inhaler which has helped her cough. Meeting with general  "surgery after findings of implant rupture on CT scan.  -Check updated CBC to ensure no anemia   -Ensure hypertension remains well controlled on home recheck    Mild aortic stenosis  -Follow on surveillance Echo again in 2 years     PACs  -Asymptomatic. Consider beta blocker in future as needed  HTN  -Mildly above goal today. Long drive to clinic.  -Continue losartan 100mg and dyazide 37.5-25mg daily for now    Hyperlipidemia   -Update fasting lipids after starting atorvastatin 20mg daily. Goal LDL < 100, adjust dose as needed        PLAN:     Repeat fasting lipids when able  Check updated CBC   Follow up with sleep study  She is planning to move to [place] soon so will not schedule follow up. Encouraged her to establish care with [place] cardiologist in future         Cira Flowers, DNP, APRN, CNP  [facility]     Orders this Visit:  Orders Placed This Encounter   Procedures     Lipid panel reflex to direct LDL Fasting     CBC with platelets     N terminal pro BNP outpatient     No orders of the defined types were placed in this encounter.    There are no discontinued medications.    Today's clinic visit entailed:  Review of external notes as documented elsewhere in note  Review of the result(s) of each unique test - Echo, Nuc, Holter  Ordering of each unique test  Prescription drug management  The level of medical decision making during this visit was of moderate complexity.           Physical Exam:   Vitals: BP (!) 141/84   Pulse 90   Ht 1.511 m (4' 11.5\")   Wt 77.1 kg (170 lb)   SpO2 100%   BMI 33.76 kg/m      Body mass index is 33.76 kg/m .  Wt Readings from Last 3 Encounters:   03/21/25 77.1 kg (170 lb)   03/06/25 76.2 kg (168 lb)   02/27/25 76.4 kg (168 lb 8 oz)        General :   Alert and oriented, in no acute distress.    Respiratory:   Respirations unlabored. Clear bilaterally with no rales, rhonchi, or wheezes.     Cardiovascular:   Rhythm is regular. S1 and S2 are " normal. +systolic murmur    Extremities: Warm and dry, trace lower edema   Neurologic: Moves all extremities, non focal    Psych:  Appropriate             Medications:     Current Outpatient Medications   Medication Sig Dispense Refill     albuterol (PROAIR HFA/PROVENTIL HFA/VENTOLIN HFA) 108 (90 Base) MCG/ACT inhaler Inhale 2 puffs into the lungs every 6 hours as needed for shortness of breath, wheezing or cough. 18 g 3     atorvastatin (LIPITOR) 20 MG tablet Take 1 tablet (20 mg) by mouth daily. 90 tablet 3     budesonide (PULMICORT) 0.5 MG/2ML neb solution Mix respule of 0.5mg/2 mL budesonide vial in 8oz normal saline sinus rinse bottle. Irrigate each nostril with half of the bottle twice daily 120 mL 1     carbidopa-levodopa (SINEMET)  MG tablet Take 1.5 tablets by mouth 3 times daily. 135 tablet 4     cetirizine (ZYRTEC) 10 MG tablet Take 1 tablet (10 mg) by mouth daily. 90 tablet 3     doxycycline hyclate (VIBRAMYCIN) 100 MG capsule Take 1 capsule (100 mg) by mouth 2 times daily. 70 capsule 0     fluticasone-salmeterol (WIXELA INHUB) 250-50 MCG/ACT inhaler Inhale 1 puff into the lungs 2 times daily. 60 each 2     losartan (COZAAR) 100 MG tablet Take 1 tablet (100 mg) by mouth daily. 90 tablet 3     magnesium 250 MG tablet Take 1 tablet by mouth daily       Melatonin 10 MG TABS tablet Take 10 mg by mouth nightly as needed for sleep       triamterene-HCTZ (DYAZIDE) 37.5-25 MG capsule Take 1 capsule by mouth daily. TAKE ONE CAPSULE BY MOUTH EVERY MORNING 90 capsule 3     vitamin C (ASCORBIC ACID) 1000 MG TABS 1,000 mg.       Zinc 30 MG TABS          Family History   Problem Relation Age of Onset     Osteoporosis Mother      Hypertension Father      Prostate Cancer Father      Colon Cancer Paternal Grandfather        Social History     Socioeconomic History     Marital status: Single     Spouse name: Not on file     Number of children: Not on file     Years of education: Not on file     Highest education  level: Not on file   Occupational History     Not on file   Tobacco Use     Smoking status: Never     Smokeless tobacco: Never   Vaping Use     Vaping status: Never Used   Substance and Sexual Activity     Alcohol use: Yes     Comment: Social     Drug use: Never     Sexual activity: Not Currently     Partners: Male     Birth control/protection: Female Surgical   Other Topics Concern     Parent/sibling w/ CABG, MI or angioplasty before 65F 55M? No   Social History Narrative     Not on file     Social Drivers of Health     Financial Resource Strain: Low Risk  (11/18/2024)    Financial Resource Strain      Within the past 12 months, have you or your family members you live with been unable to get utilities (heat, electricity) when it was really needed?: No   Food Insecurity: Low Risk  (11/18/2024)    Food Insecurity      Within the past 12 months, did you worry that your food would run out before you got money to buy more?: No      Within the past 12 months, did the food you bought just not last and you didn t have money to get more?: No   Transportation Needs: Low Risk  (11/18/2024)    Transportation Needs      Within the past 12 months, has lack of transportation kept you from medical appointments, getting your medicines, non-medical meetings or appointments, work, or from getting things that you need?: No   Physical Activity: Insufficiently Active (11/18/2024)    Exercise Vital Sign      Days of Exercise per Week: 1 day      Minutes of Exercise per Session: 10 min   Stress: No Stress Concern Present (11/18/2024)    [employer] [place] [employer] - Occupational Stress Questionnaire      Feeling of Stress : Not at all   Social Connections: Unknown (11/18/2024)    Social Connection and Isolation Panel [NHANES]      Frequency of Communication with Friends and Family: Not on file      Frequency of Social Gatherings with Friends and Family: Patient declined      Attends Shinto Services: Not on file       Active Member of Clubs or Organizations: Not on file      Attends Club or Organization Meetings: Not on file      Marital Status: Not on file   Interpersonal Safety: Low Risk  (11/18/2024)    Interpersonal Safety      Do you feel physically and emotionally safe where you currently live?: Yes      Within the past 12 months, have you been hit, slapped, kicked or otherwise physically hurt by someone?: No      Within the past 12 months, have you been humiliated or emotionally abused in other ways by your partner or ex-partner?: No   Housing Stability: Low Risk  (11/18/2024)    Housing Stability      Do you have housing? : Yes      Are you worried about losing your housing?: No          Past Medical History:     Past Medical History:   Diagnosis Date     Breast cancer (H) 1995     Essential hypertension      Melanoma (H) 2019            Past Surgical History:     Past Surgical History:   Procedure Laterality Date     BIOPSY  2019     BREAST SURGERY       COLECTOMY      polyps.     COLONOSCOPY N/A 08/12/2019    Procedure: COLONOSCOPY;  Surgeon: Mario Alberto Villanueva MD;  Location:  GI     masectomy       ORTHOPEDIC SURGERY  2020    Hip replacement            Allergies:   Patient has no known allergies.       Data Reviewed today:   Most Recent Echocardiogram: 3/4/2025  Summary  Left ventricular systolic function is normal.  The visual ejection fraction is 65-70%.  No regional wall motion abnormalities noted.  Mild valvular aortic stenosis.  The mean AoV pressure gradient is 11mmHg.  There is mild (1+) aortic regurgitation.  Compared to echo from September 2023, the gradients across aortic valve are  unchanged. Aortic valve area was underestimated on prior study due to  underestimation of the LVOT diameter. The study was technically difficult.  _________________________________________________________________________    Stress testing: 3/4/2025     The nuclear stress test is negative for inducible myocardial ischemia or  infarction.     Left ventricular function is hyperdynamic.     The left ventricular ejection fraction at rest is greater than 70%. The left ventricular ejection fraction at stress is greater than 70%.     TID is present quantitatively but not visually.     There is no prior study for comparison.    Holter: 2/2025  1. Lianet Mendez was monitored for 48:00 hours. The overall quality of the tracing was fair. The principle rhythm was sinus. During this time the average heart rate was 88 bpm. The minimum heart rate was 66 bpm at 3:34 AM and the maximum heart rate was 121 bpm at 8:32 AM. There were zero pauses greater than 2.0 seconds. The TN interval measured .12-.14 seconds, the QRS duration .08 seconds, the QT interval .27-.40 seconds.  2. There were 8 isolated ventricular ectopics.  3. There were 204 isolated SVEs, 8 couplets, 2 triplets and 4 runs. The longest run was 6 beats at 8:19 AM. The fastest run was 4 beats with a rate of 153 bpm at 8:25 AM.  4. No events were logged during the recording period.    All laboratory data reviewed:    Recent Labs   Lab Test 11/18/24  1333 07/28/23  0902 10/18/22  1459 09/27/21  1052 05/29/19  1550   * 119* 118*   < >  --    HDL 81 66 73   < >  --    NHDL 144* 150* 157*   < >  --    CHOL 225* 216* 230*   < >  --    TRIG 107 153* 197*   < >  --    TSH  --   --   --   --  0.76    < > = values in this interval not displayed.       Lab Results   Component Value Date    WBC 6.1 12/03/2021    WBC 6.3 06/18/2019    RBC 4.17 12/03/2021    RBC 4.74 06/18/2019    HGB 11.9 12/03/2021    HGB 13.1 06/18/2019    HCT 35.9 12/03/2021    HCT 40.3 06/18/2019    MCV 86 12/03/2021    MCV 85 06/18/2019    MCH 28.5 12/03/2021    MCH 27.6 06/18/2019    MCHC 33.1 12/03/2021    MCHC 32.5 06/18/2019    RDW 14.6 12/03/2021    RDW 14.5 06/18/2019     12/03/2021     06/18/2019       Lab Results   Component Value Date     11/18/2024     06/18/2019    POTASSIUM 4.4 11/18/2024  "   POTASSIUM 4.2 10/18/2022    POTASSIUM 3.9 06/18/2019    CHLORIDE 102 11/18/2024    CHLORIDE 104 10/18/2022    CHLORIDE 104 06/18/2019    CO2 24 11/18/2024    CO2 30 10/18/2022    CO2 29 06/18/2019    ANIONGAP 12 11/18/2024    ANIONGAP 4 10/18/2022    ANIONGAP 7 06/18/2019     (H) 11/18/2024     (H) 10/18/2022    GLC 98 06/18/2019    BUN 38.6 (H) 11/18/2024    BUN 36 (H) 10/18/2022    BUN 25 06/18/2019    CR 1.0 02/25/2025    CR 0.96 (H) 11/18/2024    CR 0.78 06/18/2019    GFRESTIMATED 58 (L) 02/25/2025    GFRESTIMATED 76 06/18/2019    GFRESTBLACK 88 06/18/2019    LIDA 9.8 11/18/2024    LIDA 9.3 06/18/2019      Lab Results   Component Value Date    AST 16 11/18/2024    AST 16 05/29/2019    ALT 10 11/18/2024    ALT 16 05/29/2019       No results found for: \"A1C\"    The longitudinal plan of care for Lianet was addressed during this visit. Due to the added complexity in care, I will continue to support Lianet in the subsequent management of this condition(s) and with the ongoing continuity of care of this condition(s).    This note has been dictated using voice recognition software. Any grammatical, typographical, or context distortions are unintentional and inherent to the software.      Thank you for allowing me to participate in the care of your patient.      Sincerely,     Cira Flowers, SAHARA [facility]  cc:   Wendy Benavidez MD  [address] [place] [id number]      "

## 2025-03-24 ENCOUNTER — LAB (OUTPATIENT)
Dept: LAB | Facility: CLINIC | Age: 80
End: 2025-03-24
Payer: COMMERCIAL

## 2025-03-24 DIAGNOSIS — R06.09 DYSPNEA ON EXERTION: ICD-10-CM

## 2025-03-24 DIAGNOSIS — E78.5 HYPERLIPIDEMIA LDL GOAL <100: ICD-10-CM

## 2025-03-24 LAB
CHOLEST SERPL-MCNC: 172 MG/DL
ERYTHROCYTE [DISTWIDTH] IN BLOOD BY AUTOMATED COUNT: 14 % (ref 10–15)
FASTING STATUS PATIENT QL REPORTED: YES
HCT VFR BLD AUTO: 37 % (ref 35–47)
HDLC SERPL-MCNC: 91 MG/DL
HGB BLD-MCNC: 12.2 G/DL (ref 11.7–15.7)
LDLC SERPL CALC-MCNC: 61 MG/DL
MCH RBC QN AUTO: 28.3 PG (ref 26.5–33)
MCHC RBC AUTO-ENTMCNC: 33 G/DL (ref 31.5–36.5)
MCV RBC AUTO: 86 FL (ref 78–100)
NONHDLC SERPL-MCNC: 81 MG/DL
NT-PROBNP SERPL-MCNC: 459 PG/ML (ref 0–1800)
PLATELET # BLD AUTO: 169 10E3/UL (ref 150–450)
RBC # BLD AUTO: 4.31 10E6/UL (ref 3.8–5.2)
TRIGL SERPL-MCNC: 98 MG/DL
WBC # BLD AUTO: 6.5 10E3/UL (ref 4–11)

## 2025-03-24 PROCEDURE — 80061 LIPID PANEL: CPT

## 2025-03-24 PROCEDURE — 36415 COLL VENOUS BLD VENIPUNCTURE: CPT

## 2025-03-24 PROCEDURE — 83880 ASSAY OF NATRIURETIC PEPTIDE: CPT

## 2025-03-24 PROCEDURE — 85027 COMPLETE CBC AUTOMATED: CPT

## 2025-04-07 ENCOUNTER — HOSPITAL ENCOUNTER (OUTPATIENT)
Dept: CT IMAGING | Facility: CLINIC | Age: 80
Discharge: HOME | End: 2025-04-07
Attending: OTOLARYNGOLOGY | Admitting: OTOLARYNGOLOGY
Payer: COMMERCIAL

## 2025-04-07 DIAGNOSIS — J32.9 SINUSITIS WITH NASAL POLYPS: ICD-10-CM

## 2025-04-07 DIAGNOSIS — J33.9 SINUSITIS WITH NASAL POLYPS: ICD-10-CM

## 2025-04-07 PROCEDURE — 70486 CT MAXILLOFACIAL W/O DYE: CPT

## 2025-04-07 NOTE — PROGRESS NOTES
SUBJECTIVE:                                                                   Lianet Mendez is a 79-year-old female who presents today to our Allergy Clinic at [facility]; She is being seen in consultation at the request of Dr. Avery Kwok for an evaluation of sinusitis with nasal polyposis.    The patient reports a multi-year history of nasal congestion and rhinorrhea, with discharge varying from clear to purulent. She believes she experiences sinus infections a few times per year and notes a reduced sense of smell (hyposmia). Her symptoms follow a perennial pattern without significant seasonal variation, although she recalls that they were previously worse in the fall.    Prior to seeing Dr. Kwok, she had tried and failed NetiPot, over-the-counter allergy medications, and various non-prescription sinus sprays without relief. In March 2025, she was evaluated by Dr. Kwok, who identified nasal polyposis via rigid nasal endoscopy. She was started on budesonide/saline irrigations and doxycycline, and reports that she has felt significantly better since initiating this treatment.    In addition, her daughter had observed wheezing and a dry cough over the past few years, prompting a referral to pulmonology. She was started on Wixela, which has led to marked improvement. She now rarely uses albuterol, though she keeps it on hand. She currently denies wheezing, chest tightness, or shortness of breath.    INDICATION: DICOM MODE FOR NAVIGATED SINUS SURGERY WITH MEDTRONIC  COMPARISON: None.  TECHNIQUE: Routine without contrast. Multiplanar reformats. Dose reduction techniques were used.     FINDINGS:      FRONTAL SINUSES: Near complete opacification of the right frontal sinus     ETHMOID SINUSES: Near complete nonaggressive opacification of the right greater than left ethmoid air cells.     SPHENOID SINUSES: Moderate nonaggressive mucosal thickening. The in situ secretions.       MAXILLARY SINUSES: Near complete nonaggressive opacification of the right greater than left maxillary sinuses The floors of the maxillary sinuses are intact.     NASAL CAVITY/SKULL BASE: Leftward spurring of the nasal septum. Unremarkable nasal turbinates. The cribriform plate is intact and symmetric. The anterior clinoid processes are pneumatized.     PARANASAL SINUS DRAINAGE PATHWAYS:  Apposition of the paranasal sinus drainage pathways     NON-SINUS STRUCTURES: No abnormality of the visualized orbits or intracranial compartment.                                                                      IMPRESSION:     1.  Nonaggressive paranasal sinus mucosal thickening with near complete opacification of the right frontal, right greater than left ethmoid, and right greater than left maxillary sinuses.     2.  Leftward spurring of the nasal septum.  Pulmonary function test performed in February 2025 with normal spirometry.      Patient Active Problem List   Diagnosis    History of colonic polyps    Hyperlipidemia LDL goal <100       Past Medical History:   Diagnosis Date    Breast cancer (H) 1995    Essential hypertension     Melanoma (H) 2019      Problem (# of Occurrences) Relation (Name,Age of Onset)    Hypertension (1) Father (Marvin 3rd)    Osteoporosis (1) Mother (Ana)    Prostate Cancer (1) Father (Marvin 3rd)    Colon Cancer (1) Paternal Grandfather (Marvin)          Past Surgical History:   Procedure Laterality Date    BIOPSY  2019    BREAST SURGERY      COLECTOMY      polyps.    COLONOSCOPY N/A 08/12/2019    Procedure: COLONOSCOPY;  Surgeon: Mario Alberto Villanueva MD;  Location:  GI    masectomy      ORTHOPEDIC SURGERY  2020    Hip replacement     Social History     Socioeconomic History    Marital status: Single     Spouse name: None    Number of children: None    Years of education: None    Highest education level: None   Tobacco Use    Smoking status: Never    Smokeless tobacco: Never   Vaping Use     Vaping status: Never Used   Substance and Sexual Activity    Alcohol use: Yes     Comment: Social    Drug use: Never    Sexual activity: Not Currently     Partners: Male     Birth control/protection: Female Surgical   Other Topics Concern    Parent/sibling w/ CABG, MI or angioplasty before 65F 55M? No   Social History Narrative    April 8, 2025-lives with son and daughter-in-law        ENVIRONMENTAL HISTORY: [place] The home is heated with a forced air. They does have central air conditioning. The patient's bedroom is furnished with carpeting in bedroom and fabric window coverings.  Pets inside the house include 5 cat(s) and 6 dog(s), 12 horse(s), 5 cows, several sheep, goats, chickens, turkeys, and ducks. There is no history of cockroach or mice infestation. There is/are 0 smokers in the house.  The house does not have a damp basement.      Social Drivers of Health     Financial Resource Strain: Low Risk  (11/18/2024)    Financial Resource Strain     Within the past 12 months, have you or your family members you live with been unable to get utilities (heat, electricity) when it was really needed?: No   Food Insecurity: Low Risk  (11/18/2024)    Food Insecurity     Within the past 12 months, did you worry that your food would run out before you got money to buy more?: No     Within the past 12 months, did the food you bought just not last and you didn t have money to get more?: No   Transportation Needs: Low Risk  (11/18/2024)    Transportation Needs     Within the past 12 months, has lack of transportation kept you from medical appointments, getting your medicines, non-medical meetings or appointments, work, or from getting things that you need?: No   Physical Activity: Insufficiently Active (11/18/2024)    Exercise Vital Sign     Days of Exercise per Week: 1 day     Minutes of Exercise per Session: 10 min   Stress: No Stress Concern Present (11/18/2024)    Citizen of [place]  [place] [employer] - Occupational Stress Questionnaire     Feeling of Stress : Not at all   Social Connections: Unknown (11/18/2024)    Social Connection and Isolation Panel [NHANES]     Frequency of Social Gatherings with Friends and Family: Patient declined   Interpersonal Safety: Low Risk  (11/18/2024)    Interpersonal Safety     Do you feel physically and emotionally safe where you currently live?: Yes     Within the past 12 months, have you been hit, slapped, kicked or otherwise physically hurt by someone?: No     Within the past 12 months, have you been humiliated or emotionally abused in other ways by your partner or ex-partner?: No   Housing Stability: Low Risk  (11/18/2024)    Housing Stability     Do you have housing? : Yes     Are you worried about losing your housing?: No               Current Outpatient Medications:     albuterol (PROAIR HFA/PROVENTIL HFA/VENTOLIN HFA) 108 (90 Base) MCG/ACT inhaler, Inhale 2 puffs into the lungs every 6 hours as needed for shortness of breath, wheezing or cough., Disp: 18 g, Rfl: 3    atorvastatin (LIPITOR) 20 MG tablet, Take 1 tablet (20 mg) by mouth daily., Disp: 90 tablet, Rfl: 3    budesonide (PULMICORT) 0.5 MG/2ML neb solution, Mix respule of 0.5mg/2 mL budesonide vial in 8oz normal saline sinus rinse bottle. Irrigate each nostril with half of the bottle twice daily, Disp: 120 mL, Rfl: 1    carbidopa-levodopa (SINEMET)  MG tablet, Take 1.5 tablets by mouth 3 times daily., Disp: 135 tablet, Rfl: 4    cetirizine (ZYRTEC) 10 MG tablet, Take 1 tablet (10 mg) by mouth daily., Disp: 90 tablet, Rfl: 3    doxycycline hyclate (VIBRAMYCIN) 100 MG capsule, Take 1 capsule (100 mg) by mouth 2 times daily., Disp: 70 capsule, Rfl: 0    fluticasone-salmeterol (WIXELA INHUB) 250-50 MCG/ACT inhaler, Inhale 1 puff into the lungs 2 times daily., Disp: 60 each, Rfl: 2    losartan (COZAAR) 100 MG tablet, Take 1 tablet (100 mg) by mouth daily., Disp: 90 tablet, Rfl:  "3    magnesium 250 MG tablet, Take 1 tablet by mouth daily, Disp: , Rfl:     Melatonin 10 MG TABS tablet, Take 10 mg by mouth nightly as needed for sleep, Disp: , Rfl:     triamterene-HCTZ (DYAZIDE) 37.5-25 MG capsule, Take 1 capsule by mouth daily. TAKE ONE CAPSULE BY MOUTH EVERY MORNING, Disp: 90 capsule, Rfl: 3    vitamin C (ASCORBIC ACID) 1000 MG TABS, 1,000 mg., Disp: , Rfl:     Zinc 30 MG TABS, , Disp: , Rfl:   Immunization History   Administered Date(s) Administered    COVID-19 12+ (MODERNA) 10/22/2024    COVID-19 12+ (Pfizer) 11/10/2023    COVID-19 Bivalent 12+ (Pfizer) 10/18/2022    COVID-19 MONOVALENT 12+ (Pfizer) 03/04/2021, 03/25/2021, 10/08/2021    Flu 65+ (Fluad) 10/22/2024    Influenza (H1N1) 01/13/2010    Influenza (High Dose) Trivalent,PF (Fluzone) 09/27/2017, 11/15/2018, 10/29/2019, 09/15/2020    Influenza (IIV3) PF 10/05/2010, 09/29/2011    Influenza (prior to 2024) 09/15/2020    Influenza Vaccine 65+ (FLUAD) 11/10/2023    Influenza Vaccine 65+ (Fluzone HD) 09/27/2021, 10/18/2022    Influenza Vaccine >6 months,quad, PF 09/17/2013    Pneumococcal 20 valent Conjugate (Prevnar 20) 07/28/2023    Zoster vaccine, live 01/13/2010     No Known Allergies  OBJECTIVE:                                                                 /81   Pulse 91   Ht 1.53 m (5' 0.24\")   Wt 78.4 kg (172 lb 13.5 oz)   SpO2 100%   BMI 33.49 kg/m              Physical Exam  Vitals and nursing note reviewed.   Constitutional:       General: She is not in acute distress.     Appearance: She is not ill-appearing, toxic-appearing or diaphoretic.   HENT:      Head: Normocephalic and atraumatic.      Right Ear: Tympanic membrane, ear canal and external ear normal.      Left Ear: Tympanic membrane, ear canal and external ear normal.      Nose: No mucosal edema, congestion or rhinorrhea.      Right Turbinates: Not enlarged or swollen.      Left Turbinates: Not enlarged or swollen.      Comments: Above left middle meatus, " barely visible, gray/translucent mass consistent with a nasal polyp.  Nonobstructive.     Mouth/Throat:      Lips: Pink.      Mouth: Mucous membranes are moist.      Pharynx: Oropharynx is clear. No pharyngeal swelling, oropharyngeal exudate, posterior oropharyngeal erythema or uvula swelling.   Eyes:      General:         Right eye: No discharge.         Left eye: No discharge.      Conjunctiva/sclera: Conjunctivae normal.   Cardiovascular:      Rate and Rhythm: Normal rate and regular rhythm.   Pulmonary:      Effort: Pulmonary effort is normal. No respiratory distress.      Breath sounds: Normal breath sounds and air entry. No stridor, decreased air movement or transmitted upper airway sounds. No decreased breath sounds, wheezing, rhonchi or rales.   Musculoskeletal:         General: Normal range of motion.   Neurological:      Mental Status: She is alert and oriented to person, place, and time.   Psychiatric:         Mood and Affect: Mood normal.         Behavior: Behavior normal.       At today's visit, the patient and I engaged in an informed consent discussion about allergy testing.  We discussed skin testing, blood testing, and the alternative of not undergoing any testing. The patient has a preference for skin testing. We then discussed the risks and benefits of skin testing.  The patient understands skin testing risks can include, but are not limited to, urticaria, angioedema, shortness of breath, and severe anaphylaxis.  The benefits include, but are not limited, to evaluation for allergens causing symptoms.  After answering the patient's questions they have agreed to proceed with skin testing.    ENVIRONMENTAL PERCUTANEOUS SKIN TESTING: ADULT      4/8/2025     2:00 PM   [facility]   Consent Y   Ordering Physician Jeri   Interpreting Physician Jeri   Testing Technician Maribel   Location Arm   Time start: 14:05   Time End: 14:20   Positive Control: Histatrol*ALK 1 mg/ml 3/3   Negative  Control: 50% Glycerin 0   Cat Hair*ALK (10,000 BAU/ml) 4/4   AP Dog Hair/Dander (1:100 w/v) 3/3   Dust Mite p. 30,000 AU/ml 0   Dust Mite f. (30,000 AU/ml) 3/3   Yamil (W/F in millimeters) 0   Harjinder Grass (100,000 BAU/mL) 0   Red Cedar (W/F in millimeters) 0   Maple/[place] (W/F in millimeters) 0   Hackberry (W/F in millimeters) 0   [place] (W/F in millimeters) 0   [place] *ALK (W/F in millimeters) 0   American Elm (W/F in millimeters) 0   [place] (W/F in millimeters) 0   [place] (W/F in millimeters) 0   Birch Mix (W/F in millimeters) 0   [place] (W/F in millimeters) 0   Oak (W/F in millimeters) 0   C[place]le[place] (W/F in millimeters) 0   [place] (W/F in millimeters) 0   [place] (W/F in millimeters) 0   Careless (W/F in millimeters) 0   Nettle (W/F in millimeters) 0   English Plantain (W/F in millimeters) 0   Kochia (W/F in millimeters) 0   Lamb's Quarter (W/F in millimeters) 0   Marshelder (W/F in millimeters) 0   Ragweed Mix* ALK (W/F in millimeters) 0   Russian Thistle (W/F in millimeters) 0   Sagebrush/Mugwort (W/F in millimeters) 0   Sheep Sorrel (W/F in millimeters) 0   Feather Mix* ALK (W/F in millimeters) 0   Penicillium Mix (1:10 w/v) 0   Curvularia spicifera (1:10 w/v) 0   Epicoccum (1:10 w/v) 0   Aspergillus fumigatus (1:10 w/v): 0   Alternaria tenius (1:10 w/v) 0   H. Cladosporium (1:10 w/v) 0   Phoma herbarum (1:10 w/v) 0   Cow* ALK (W/F in millimeters) 0    Horse* ALK (W/F in millimeters) 0      My interpretation: SPT for aeroallergens performed today (April 8, 2025) with mild positive test results for cat, dog, and dust mite.  The patient had appropriate responses to positive and negative controls.        ASSESSMENT/PLAN:      Sinusitis with nasal polyps  Allergic rhinitis due to animals  Allergic rhinitis due to dust mite    We reviewed avoidance measures and provided education based on the skin test results.    To evaluate for possible underlying causes of nasal polyps, I ordered ANCA  testing to rule out vasculitis, as well as a CBC with differential to screen for hypereosinophilia.    The patient reports significant improvement in symptoms since starting budesonide/saline irrigations, and at this time, she does not feel the need for more aggressive treatment. We discussed three options: 1) continuing budesonide/saline irrigations with the addition of azelastine nasal spray (2 sprays in each nostril twice daily as needed), or 2) pursuing the same regimen with the addition of 3) allergen immunotherapy or dupilumab.    I emphasized that allergen immunotherapy would not directly address nasal polyps, although it may help with associated allergic inflammation.    The patient feels she is doing fairly well and prefers to continue with the current budesonide/saline irrigations and trial azelastine as an adjunctive therapy at this time.    - Adult Allergy/Asthma  Referral  - ALLERGY SKIN TESTS,ALLERGENS  - ANCA IgG by IFA with Reflex to Titer  - Myeloperoxidase and Proteinase 3 Panel  - CBC with Platelets & Differential    Follow-up in 3 months or sooner if needed.    Thank you for allowing us to participate in the care of this patient. Please feel free to contact us if there are any questions or concerns about the patient.    Disclaimer: This note consists of symbols derived from keyboarding, dictation and/or voice recognition software. As a result, there may be errors in the script that have gone undetected. Please consider this when interpreting information found in this chart.    Consent was obtained from the patient to use an AI documentation tool in the creation of this note.    Darrel Wilcox MD, FAAAAI, FACAAI  Allergy and Asthma     [facility]

## 2025-04-07 NOTE — PATIENT INSTRUCTIONS
Continue budesonide/saline irrigations.  Add azelastine 2 sprays in each nostril twice daily as needed.  Get the blood work done.    Allergen immunotherapy, also known as allergy shots, is a long-term treatment that decreases symptoms for many people with allergic rhinitis, allergic asthma, or allergic conjunctivitis. It involves gradually increasing doses of allergens to build up tolerance and reduce sensitivity, effectively altering the immune system's response to them.     Allergy shots will help you with allergy symptoms, but not with polyps.  If polyps continue bothering you, we may need to consider dupilumab (Dupixent), a biologic medicine approved for treatment of nasal polyposis.      Dr Wilcox [phone]    All visits for food challenges, venom allergy testing, and medication/drug challenges MUST be scheduled through the allergy clinic nurse. Please send a [employer] message or call the allergy scheduling line and ask to speak with Dr Wilcox's team for scheduling these appointments. Appointments for these visits that are made through the schedulers or via [employer] may be cancelled or rescheduled.    Allergy Shot Room ([place]): [phone]    Pulmonary Function [phone]    Prescription Assistance  If you need assistance with your prescriptions (cost, coverage, etc) please contact: [facility] [phone]

## 2025-04-08 ENCOUNTER — OFFICE VISIT (OUTPATIENT)
Dept: ALLERGY | Facility: OTHER | Age: 80
End: 2025-04-08
Attending: OTOLARYNGOLOGY
Payer: COMMERCIAL

## 2025-04-08 VITALS
HEIGHT: 60 IN | OXYGEN SATURATION: 100 % | BODY MASS INDEX: 33.93 KG/M2 | WEIGHT: 172.84 LBS | DIASTOLIC BLOOD PRESSURE: 81 MMHG | HEART RATE: 91 BPM | SYSTOLIC BLOOD PRESSURE: 135 MMHG

## 2025-04-08 DIAGNOSIS — J32.9 SINUSITIS WITH NASAL POLYPS: Primary | ICD-10-CM

## 2025-04-08 DIAGNOSIS — J30.81 ALLERGIC RHINITIS DUE TO ANIMALS: ICD-10-CM

## 2025-04-08 DIAGNOSIS — J33.9 SINUSITIS WITH NASAL POLYPS: Primary | ICD-10-CM

## 2025-04-08 DIAGNOSIS — J30.89 ALLERGIC RHINITIS DUE TO DUST MITE: ICD-10-CM

## 2025-04-08 LAB
BASOPHILS # BLD AUTO: 0 10E3/UL (ref 0–0.2)
BASOPHILS NFR BLD AUTO: 0 %
EOSINOPHIL # BLD AUTO: 0.3 10E3/UL (ref 0–0.7)
EOSINOPHIL NFR BLD AUTO: 4 %
ERYTHROCYTE [DISTWIDTH] IN BLOOD BY AUTOMATED COUNT: 13.5 % (ref 10–15)
HCT VFR BLD AUTO: 38.8 % (ref 35–47)
HGB BLD-MCNC: 12.5 G/DL (ref 11.7–15.7)
IMM GRANULOCYTES # BLD: 0 10E3/UL
IMM GRANULOCYTES NFR BLD: 0 %
LYMPHOCYTES # BLD AUTO: 2 10E3/UL (ref 0.8–5.3)
LYMPHOCYTES NFR BLD AUTO: 28 %
MCH RBC QN AUTO: 28 PG (ref 26.5–33)
MCHC RBC AUTO-ENTMCNC: 32.2 G/DL (ref 31.5–36.5)
MCV RBC AUTO: 87 FL (ref 78–100)
MONOCYTES # BLD AUTO: 0.4 10E3/UL (ref 0–1.3)
MONOCYTES NFR BLD AUTO: 6 %
NEUTROPHILS # BLD AUTO: 4.5 10E3/UL (ref 1.6–8.3)
NEUTROPHILS NFR BLD AUTO: 61 %
PLATELET # BLD AUTO: 196 10E3/UL (ref 150–450)
RBC # BLD AUTO: 4.47 10E6/UL (ref 3.8–5.2)
WBC # BLD AUTO: 7.3 10E3/UL (ref 4–11)

## 2025-04-08 PROCEDURE — 86036 ANCA SCREEN EACH ANTIBODY: CPT | Performed by: ALLERGY & IMMUNOLOGY

## 2025-04-08 PROCEDURE — 99204 OFFICE O/P NEW MOD 45 MIN: CPT | Mod: 25 | Performed by: ALLERGY & IMMUNOLOGY

## 2025-04-08 PROCEDURE — 36415 COLL VENOUS BLD VENIPUNCTURE: CPT | Performed by: ALLERGY & IMMUNOLOGY

## 2025-04-08 PROCEDURE — 85025 COMPLETE CBC W/AUTO DIFF WBC: CPT | Performed by: ALLERGY & IMMUNOLOGY

## 2025-04-08 PROCEDURE — 95004 PERQ TESTS W/ALRGNC XTRCS: CPT | Performed by: ALLERGY & IMMUNOLOGY

## 2025-04-08 PROCEDURE — 3075F SYST BP GE 130 - 139MM HG: CPT | Performed by: ALLERGY & IMMUNOLOGY

## 2025-04-08 PROCEDURE — 3079F DIAST BP 80-89 MM HG: CPT | Performed by: ALLERGY & IMMUNOLOGY

## 2025-04-08 RX ORDER — AZELASTINE 1 MG/ML
2 SPRAY, METERED NASAL 2 TIMES DAILY PRN
Qty: 30 ML | Refills: 3 | Status: SHIPPED | OUTPATIENT
Start: 2025-04-08

## 2025-04-08 ASSESSMENT — ASTHMA QUESTIONNAIRES: ACT_TOTALSCORE: 23

## 2025-04-08 NOTE — LETTER
4/8/2025      Lianet Mendez  [id number] [address] [place] [id number]      Dear Colleague,    Thank you for referring your patient, Lianet Mendez, to the [facility]. Please see a copy of my visit note below.    SUBJECTIVE:                                                                   Lianet Mendez is a 79-year-old female who presents today to our Allergy Clinic at [facility]; She is being seen in consultation at the request of Dr. Avery Kwok for an evaluation of sinusitis with nasal polyposis.    The patient reports a multi-year history of nasal congestion and rhinorrhea, with discharge varying from clear to purulent. She believes she experiences sinus infections a few times per year and notes a reduced sense of smell (hyposmia). Her symptoms follow a perennial pattern without significant seasonal variation, although she recalls that they were previously worse in the fall.    Prior to seeing Dr. Kwok, she had tried and failed NetiPot, over-the-counter allergy medications, and various non-prescription sinus sprays without relief. In March 2025, she was evaluated by Dr. Kwok, who identified nasal polyposis via rigid nasal endoscopy. She was started on budesonide/saline irrigations and doxycycline, and reports that she has felt significantly better since initiating this treatment.    In addition, her daughter had observed wheezing and a dry cough over the past few years, prompting a referral to pulmonology. She was started on Wixela, which has led to marked improvement. She now rarely uses albuterol, though she keeps it on hand. She currently denies wheezing, chest tightness, or shortness of breath.    INDICATION: DICOM MODE FOR NAVIGATED SINUS SURGERY WITH MEDTRONIC  COMPARISON: None.  TECHNIQUE: Routine without contrast. Multiplanar reformats. Dose reduction techniques were used.     FINDINGS:      FRONTAL SINUSES: Near complete opacification  of the right frontal sinus     ETHMOID SINUSES: Near complete nonaggressive opacification of the right greater than left ethmoid air cells.     SPHENOID SINUSES: Moderate nonaggressive mucosal thickening. The in situ secretions.      MAXILLARY SINUSES: Near complete nonaggressive opacification of the right greater than left maxillary sinuses The floors of the maxillary sinuses are intact.     NASAL CAVITY/SKULL BASE: Leftward spurring of the nasal septum. Unremarkable nasal turbinates. The cribriform plate is intact and symmetric. The anterior clinoid processes are pneumatized.     PARANASAL SINUS DRAINAGE PATHWAYS:  Apposition of the paranasal sinus drainage pathways     NON-SINUS STRUCTURES: No abnormality of the visualized orbits or intracranial compartment.                                                                      IMPRESSION:     1.  Nonaggressive paranasal sinus mucosal thickening with near complete opacification of the right frontal, right greater than left ethmoid, and right greater than left maxillary sinuses.     2.  Leftward spurring of the nasal septum.  Pulmonary function test performed in February 2025 with normal spirometry.      Patient Active Problem List   Diagnosis     History of colonic polyps     Hyperlipidemia LDL goal <100       Past Medical History:   Diagnosis Date     Breast cancer (H) 1995     Essential hypertension      Melanoma (H) 2019      Problem (# of Occurrences) Relation (Name,Age of Onset)    Hypertension (1) Father (Marvin 3rd)    Osteoporosis (1) Mother (Ana)    Prostate Cancer (1) Father (Marvin 3rd)    Colon Cancer (1) Paternal Grandfather (Marvin)          Past Surgical History:   Procedure Laterality Date     BIOPSY  2019     BREAST SURGERY       COLECTOMY      polyps.     COLONOSCOPY N/A 08/12/2019    Procedure: COLONOSCOPY;  Surgeon: Mario Alberto Villanueva MD;  Location:  GI     masectomy       ORTHOPEDIC SURGERY  2020    Hip replacement     Social History      Socioeconomic History     Marital status: Single     Spouse name: None     Number of children: None     Years of education: None     Highest education level: None   Tobacco Use     Smoking status: Never     Smokeless tobacco: Never   Vaping Use     Vaping status: Never Used   Substance and Sexual Activity     Alcohol use: Yes     Comment: Social     Drug use: Never     Sexual activity: Not Currently     Partners: Male     Birth control/protection: Female Surgical   Other Topics Concern     Parent/sibling w/ CABG, MI or angioplasty before 65F 55M? No   Social History Narrative    April 8, 2025-lives with son and daughter-in-law        ENVIRONMENTAL HISTORY: [place] is heated with a forced air. They does have central air conditioning. The patient's bedroom is furnished with carpeting in bedroom and fabric window coverings.  Pets inside the house include 5 cat(s) and 6 dog(s), 12 horse(s), 5 cows, several sheep, goats, chickens, turkeys, and ducks. There is no history of cockroach or mice infestation. There is/are 0 smokers in the house.  The house does not have a damp basement.      Social Drivers of Health     Financial Resource Strain: Low Risk  (11/18/2024)    Financial Resource Strain      Within the past 12 months, have you or your family members you live with been unable to get utilities (heat, electricity) when it was really needed?: No   Food Insecurity: Low Risk  (11/18/2024)    Food Insecurity      Within the past 12 months, did you worry that your food would run out before you got money to buy more?: No      Within the past 12 months, did the food you bought just not last and you didn t have money to get more?: No   Transportation Needs: Low Risk  (11/18/2024)    Transportation Needs      Within the past 12 months, has lack of transportation kept you from medical appointments, getting your medicines, non-medical meetings or appointments, work, or  from getting things that you need?: No   Physical Activity: Insufficiently Active (11/18/2024)    Exercise Vital Sign      Days of Exercise per Week: 1 day      Minutes of Exercise per Session: 10 min   Stress: No Stress Concern Present (11/18/2024)    [employer] [place] [employer] - Occupational Stress Questionnaire      Feeling of Stress : Not at all   Social Connections: Unknown (11/18/2024)    Social Connection and Isolation Panel [NHANES]      Frequency of Social Gatherings with Friends and Family: Patient declined   Interpersonal Safety: Low Risk  (11/18/2024)    Interpersonal Safety      Do you feel physically and emotionally safe where you currently live?: Yes      Within the past 12 months, have you been hit, slapped, kicked or otherwise physically hurt by someone?: No      Within the past 12 months, have you been humiliated or emotionally abused in other ways by your partner or ex-partner?: No   Housing Stability: Low Risk  (11/18/2024)    Housing Stability      Do you have housing? : Yes      Are you worried about losing your housing?: No               Current Outpatient Medications:      albuterol (PROAIR HFA/PROVENTIL HFA/VENTOLIN HFA) 108 (90 Base) MCG/ACT inhaler, Inhale 2 puffs into the lungs every 6 hours as needed for shortness of breath, wheezing or cough., Disp: 18 g, Rfl: 3     atorvastatin (LIPITOR) 20 MG tablet, Take 1 tablet (20 mg) by mouth daily., Disp: 90 tablet, Rfl: 3     budesonide (PULMICORT) 0.5 MG/2ML neb solution, Mix respule of 0.5mg/2 mL budesonide vial in 8oz normal saline sinus rinse bottle. Irrigate each nostril with half of the bottle twice daily, Disp: 120 mL, Rfl: 1     carbidopa-levodopa (SINEMET)  MG tablet, Take 1.5 tablets by mouth 3 times daily., Disp: 135 tablet, Rfl: 4     cetirizine (ZYRTEC) 10 MG tablet, Take 1 tablet (10 mg) by mouth daily., Disp: 90 tablet, Rfl: 3     doxycycline hyclate (VIBRAMYCIN) 100 MG capsule, Take 1 capsule (100 mg) by  "mouth 2 times daily., Disp: 70 capsule, Rfl: 0     fluticasone-salmeterol (WIXELA INHUB) 250-50 MCG/ACT inhaler, Inhale 1 puff into the lungs 2 times daily., Disp: 60 each, Rfl: 2     losartan (COZAAR) 100 MG tablet, Take 1 tablet (100 mg) by mouth daily., Disp: 90 tablet, Rfl: 3     magnesium 250 MG tablet, Take 1 tablet by mouth daily, Disp: , Rfl:      Melatonin 10 MG TABS tablet, Take 10 mg by mouth nightly as needed for sleep, Disp: , Rfl:      triamterene-HCTZ (DYAZIDE) 37.5-25 MG capsule, Take 1 capsule by mouth daily. TAKE ONE CAPSULE BY MOUTH EVERY MORNING, Disp: 90 capsule, Rfl: 3     vitamin C (ASCORBIC ACID) 1000 MG TABS, 1,000 mg., Disp: , Rfl:      Zinc 30 MG TABS, , Disp: , Rfl:   Immunization History   Administered Date(s) Administered     COVID-19 12+ (MODERNA) 10/22/2024     COVID-19 12+ (Pfizer) 11/10/2023     COVID-19 Bivalent 12+ (Pfizer) 10/18/2022     COVID-19 MONOVALENT 12+ (Pfizer) 03/04/2021, 03/25/2021, 10/08/2021     Flu 65+ (Fluad) 10/22/2024     Influenza (H1N1) 01/13/2010     Influenza (High Dose) Trivalent,PF (Fluzone) 09/27/2017, 11/15/2018, 10/29/2019, 09/15/2020     Influenza (IIV3) PF 10/05/2010, 09/29/2011     Influenza (prior to 2024) 09/15/2020     Influenza Vaccine 65+ (FLUAD) 11/10/2023     Influenza Vaccine 65+ (Fluzone HD) 09/27/2021, 10/18/2022     Influenza Vaccine >6 months,quad, PF 09/17/2013     Pneumococcal 20 valent Conjugate (Prevnar 20) 07/28/2023     Zoster vaccine, live 01/13/2010     No Known Allergies  OBJECTIVE:                                                                 /81   Pulse 91   Ht 1.53 m (5' 0.24\")   Wt 78.4 kg (172 lb 13.5 oz)   SpO2 100%   BMI 33.49 kg/m              Physical Exam  Vitals and nursing note reviewed.   Constitutional:       General: She is not in acute distress.     Appearance: She is not ill-appearing, toxic-appearing or diaphoretic.   HENT:      Head: Normocephalic and atraumatic.      Right Ear: Tympanic membrane, " ear canal and external ear normal.      Left Ear: Tympanic membrane, ear canal and external ear normal.      Nose: No mucosal edema, congestion or rhinorrhea.      Right Turbinates: Not enlarged or swollen.      Left Turbinates: Not enlarged or swollen.      Comments: Above left middle meatus, barely visible, gray/translucent mass consistent with a nasal polyp.  Nonobstructive.     Mouth/Throat:      Lips: Pink.      Mouth: Mucous membranes are moist.      Pharynx: Oropharynx is clear. No pharyngeal swelling, oropharyngeal exudate, posterior oropharyngeal erythema or uvula swelling.   Eyes:      General:         Right eye: No discharge.         Left eye: No discharge.      Conjunctiva/sclera: Conjunctivae normal.   Cardiovascular:      Rate and Rhythm: Normal rate and regular rhythm.   Pulmonary:      Effort: Pulmonary effort is normal. No respiratory distress.      Breath sounds: Normal breath sounds and air entry. No stridor, decreased air movement or transmitted upper airway sounds. No decreased breath sounds, wheezing, rhonchi or rales.   Musculoskeletal:         General: Normal range of motion.   Neurological:      Mental Status: She is alert and oriented to person, place, and time.   Psychiatric:         Mood and Affect: Mood normal.         Behavior: Behavior normal.       At today's visit, the patient and I engaged in an informed consent discussion about allergy testing.  We discussed skin testing, blood testing, and the alternative of not undergoing any testing. The patient has a preference for skin testing. We then discussed the risks and benefits of skin testing.  The patient understands skin testing risks can include, but are not limited to, urticaria, angioedema, shortness of breath, and severe anaphylaxis.  The benefits include, but are not limited, to evaluation for allergens causing symptoms.  After answering the patient's questions they have agreed to proceed with skin testing.    ENVIRONMENTAL  PERCUTANEOUS SKIN TESTING: ADULT      4/8/2025     2:00 PM   [facility]   Consent Y   Ordering Physician Jeri   Interpreting Physician Jeri   Testing Technician Maribel [place]   Time start: 14:05   Time End: 14:20   Positive Control: Histatrol*ALK 1 mg/ml 3/3   Negative Control: 50% Glycerin 0   Cat Hair*ALK (10,000 BAU/ml) 4/4   AP Dog Hair/Dander (1:100 w/v) 3/3   Dust Mite p. 30,000 AU/ml 0   Dust Mite f. (30,000 AU/ml) 3/3   Yamil (W/F in millimeters) 0   Harjinder Grass (100,000 BAU/mL) 0   Red Cedar (W/F in millimeters) 0   Maple/[place] (W/F in millimeters) 0   Hackberry (W/F in millimeters) 0   Choctaw (W/F in millimeters) 0   Elizabeth *ALK (W/F in millimeters) 0   American Elm (W/F in millimeters) 0   Greenlee (W/F in millimeters) 0   Black [place] (W/F in millimeters) 0   Birch Mix (W/F in millimeters) 0   [place] (W/F in millimeters) 0   Oak (W/F in millimeters) 0   Cocklebur (W/F in millimeters) 0   [place] (W/F in millimeters) 0   [place] (W/F in millimeters) 0   Careless (W/F in millimeters) 0   Nettle (W/F in millimeters) 0   English Plantain (W/F in millimeters) 0   Kochia (W/F in millimeters) 0   Lamb's Quarter (W/F in millimeters) 0   Marshelder (W/F in millimeters) 0   Ragweed Mix* ALK (W/F in millimeters) 0   Russian Thistle (W/F in millimeters) 0   Sagebrush/Mugwort (W/F in millimeters) 0   Sheep Sorrel (W/F in millimeters) 0   Feather Mix* ALK (W/F in millimeters) 0   Penicillium Mix (1:10 w/v) 0   Curvularia spicifera (1:10 w/v) 0   Epicoccum (1:10 w/v) 0   Aspergillus fumigatus (1:10 w/v): 0   Alternaria tenius (1:10 w/v) 0   H. Cladosporium (1:10 w/v) 0   Phoma herbarum (1:10 w/v) 0   Cow* ALK (W/F in millimeters) 0    Horse* ALK (W/F in millimeters) 0      My interpretation: SPT for aeroallergens performed today (April 8, 2025) with mild positive test results for cat, dog, and dust mite.  The patient had appropriate responses to positive and negative controls.         ASSESSMENT/PLAN:      Sinusitis with nasal polyps  Allergic rhinitis due to animals  Allergic rhinitis due to dust mite    We reviewed avoidance measures and provided education based on the skin test results.    To evaluate for possible underlying causes of nasal polyps, I ordered ANCA testing to rule out vasculitis, as well as a CBC with differential to screen for hypereosinophilia.    The patient reports significant improvement in symptoms since starting budesonide/saline irrigations, and at this time, she does not feel the need for more aggressive treatment. We discussed three options: 1) continuing budesonide/saline irrigations with the addition of azelastine nasal spray (2 sprays in each nostril twice daily as needed), or 2) pursuing the same regimen with the addition of 3) allergen immunotherapy or dupilumab.    I emphasized that allergen immunotherapy would not directly address nasal polyps, although it may help with associated allergic inflammation.    The patient feels she is doing fairly well and prefers to continue with the current budesonide/saline irrigations and trial azelastine as an adjunctive therapy at this time.    - Adult Allergy/Asthma  Referral  - ALLERGY SKIN TESTS,ALLERGENS  - ANCA IgG by IFA with Reflex to Titer  - Myeloperoxidase and Proteinase 3 Panel  - CBC with Platelets & Differential    Follow-up in 3 months or sooner if needed.    Thank you for allowing us to participate in the care of this patient. Please feel free to contact us if there are any questions or concerns about the patient.    Disclaimer: This note consists of symbols derived from keyboarding, dictation and/or voice recognition software. As a result, there may be errors in the script that have gone undetected. Please consider this when interpreting information found in this chart.    Consent was obtained from the patient to use an AI documentation tool in the creation of this note.    Darrel Wilcox MD,  GEETA RAIN  Allergy and Asthma     [facility]        Again, thank you for allowing me to participate in the care of your patient.        Sincerely,        Darrel Wilcox MD    Electronically signed

## 2025-04-09 LAB
ANCA AB PATTERN SER IF-IMP: NORMAL
C-ANCA TITR SER IF: NORMAL {TITER}

## 2025-04-12 LAB
MYELOPEROXIDASE AB SER IA-ACNC: 0.4 U/ML
MYELOPEROXIDASE AB SER IA-ACNC: NEGATIVE
PROTEINASE3 AB SER IA-ACNC: <1 U/ML
PROTEINASE3 AB SER IA-ACNC: NEGATIVE

## 2025-04-15 ENCOUNTER — OFFICE VISIT (OUTPATIENT)
Dept: NEUROLOGY | Facility: CLINIC | Age: 80
End: 2025-04-15
Payer: COMMERCIAL

## 2025-04-15 VITALS
SYSTOLIC BLOOD PRESSURE: 135 MMHG | WEIGHT: 174.2 LBS | HEART RATE: 81 BPM | BODY MASS INDEX: 33.76 KG/M2 | DIASTOLIC BLOOD PRESSURE: 88 MMHG

## 2025-04-15 DIAGNOSIS — G25.2 RESTING TREMOR: ICD-10-CM

## 2025-04-15 DIAGNOSIS — G20.C PRIMARY PARKINSONISM (H): Primary | ICD-10-CM

## 2025-04-15 DIAGNOSIS — Z79.899 ENCOUNTER FOR LONG-TERM (CURRENT) USE OF MEDICATIONS: ICD-10-CM

## 2025-04-15 PROCEDURE — 3075F SYST BP GE 130 - 139MM HG: CPT | Performed by: PSYCHIATRY & NEUROLOGY

## 2025-04-15 PROCEDURE — 99213 OFFICE O/P EST LOW 20 MIN: CPT | Performed by: PSYCHIATRY & NEUROLOGY

## 2025-04-15 PROCEDURE — 3079F DIAST BP 80-89 MM HG: CPT | Performed by: PSYCHIATRY & NEUROLOGY

## 2025-04-15 NOTE — PATIENT INSTRUCTIONS
PLAN:  Continue the carbidopa/levodopa: 1.5 tablets 3 times daily.  I do not think we need to do any additional testing, given your response to the medication.  Let's plan to follow-up again in 6 months.  Feel free to let me know if you think we need to make any medication dose adjustments in the meantime.

## 2025-04-15 NOTE — LETTER
4/15/2025      Lianet Mendez  [id number]  [facility] [address]      Dear Colleague,    Thank you for referring your patient, Lianet Mendez, to the [facility] [place]. Please see a copy of my visit note below.    ESTABLISHED PATIENT NEUROLOGY NOTE    DATE OF VISIT: 4/15/2025  MRN: [id number]  PATIENT NAME: Lianet Mendez  YOB: 1945    Chief Complaint   Patient presents with     Parkinson     Since the change of the dosage, there's been a great improvement.      SUBJECTIVE:                                                      HISTORY OF PRESENT ILLNESS:  Lianet is here for follow up regarding tremor.    History as previously documented by me (1.13.25):  Per chart review, there have been a coupe of appointments scheduled with the Movement Disorders clinic (one 2 days ago), and these have been cancelled.      No other reported parkinsonian symptoms per primary care exam. Primidone was offered, but declined in 11.2024. She was then referred on to Neurology.     Additional history of HTN, HLD and chronic cough.     Lianet is here with her son and daughter-in-law today.  They live with her.  I ask about the tremor and Lianet says this has been going on since last spring, family agrees.  Gradual worsening.  Affects the right hand only which is her dominant hand.  That being said fine motor movements such as sewing and stitching are okay.   She cannot tell if there has been any other change in mobility. Some balance problems due to sinuses.     DIL says she shuffles, is hunched over, at times. Sleep is restful, she does not move around a lot in her sleep. No changes in taste or smell.  Appetite is fine.  Recall is fine, maybe processing speed is a little slower than before but no concerns about memory in general.   Voice has gotten quieter per family.     Family does have some concerns about deconditioning, lack of activity  so they brought up their stationary  bike from the basement for Lianet to use.  Daughter-in-law also asks about strength in her right hand and if there is something that can be done for this.  Lianet does not necessarily notice any weakness.     No known family history of neurologic disease, no strokes or neurodegenerative processes as far as Lianet is aware.    Our plan was to try starting carbidopa/levodopa. In the meantime we have titrated the dose up to 1.5 tablets TID. I also referred Lianet for occupational therapy, our Parkinson's class here in clinic and we discussed possible Vicky scan.     Lianet is here alone today.   She did do some hand therapy. It was disappointing, she said the therapist made it seem like her tremor was not severe enough for any specific treatments.  She has looked up her own exercises at home and has been doing these.  She is looking forward to the warmer weather.  Unfortunately, her daughter was diagnosed with lymphoma so she will be staying around [facility] (they are eventually moving to [place]).  It has thus been a little bit of a stressful time but the tremor has not worsened.  She does try to avoid mealtime, but sometimes it is a little bit hard to get her carbidopa/levodopa doses and on time, occasionally misses 1.  Overall though, Lianet says she is happy with tremor control.  No problems with mobility or balance at this point.  She decided to hold off on the dopamine scan because of other medical issues she needed to address.     She does not necessarily notice wearing off in between doses of CD/LD.    CURRENT MEDICATIONS:   Current Outpatient Medications   Medication Sig Dispense Refill     albuterol (PROAIR HFA/PROVENTIL HFA/VENTOLIN HFA) 108 (90 Base) MCG/ACT inhaler Inhale 2 puffs into the lungs every 6 hours as needed for shortness of breath, wheezing or cough. 18 g 3     atorvastatin (LIPITOR) 20 MG tablet Take 1 tablet (20 mg) by mouth daily. 90 tablet 3     azelastine (ASTELIN) 0.1 % nasal  spray Spray 2 sprays into both nostrils 2 times daily as needed for rhinitis. 30 mL 3     budesonide (PULMICORT) 0.5 MG/2ML neb solution Mix respule of 0.5mg/2 mL budesonide vial in 8oz normal saline sinus rinse bottle. Irrigate each nostril with half of the bottle twice daily 120 mL 1     carbidopa-levodopa (SINEMET)  MG tablet Take 1.5 tablets by mouth 3 times daily. 135 tablet 4     cetirizine (ZYRTEC) 10 MG tablet Take 1 tablet (10 mg) by mouth daily. 90 tablet 3     fluticasone-salmeterol (WIXELA INHUB) 250-50 MCG/ACT inhaler Inhale 1 puff into the lungs 2 times daily. 60 each 2     losartan (COZAAR) 100 MG tablet Take 1 tablet (100 mg) by mouth daily. 90 tablet 3     magnesium 250 MG tablet Take 1 tablet by mouth daily       Melatonin 10 MG TABS tablet Take 10 mg by mouth nightly as needed for sleep       triamterene-HCTZ (DYAZIDE) 37.5-25 MG capsule Take 1 capsule by mouth daily. TAKE ONE CAPSULE BY MOUTH EVERY MORNING 90 capsule 3     vitamin C (ASCORBIC ACID) 1000 MG TABS 1,000 mg.       Zinc 30 MG TABS        doxycycline hyclate (VIBRAMYCIN) 100 MG capsule Take 1 capsule (100 mg) by mouth 2 times daily. 70 capsule 0     No current facility-administered medications for this visit.       RECENT DIAGNOSTIC STUDIES:   Labs: No results found for any visits on 04/15/25.      REVIEW OF SYSTEMS:                                                      10-point review of systems is negative except as mentioned above in HPI.    EXAM:                                                      Physical Exam:   Vitals: /88   Pulse 81   Wt 79 kg (174 lb 3.2 oz)   BMI 33.76 kg/m    BMI= Body mass index is 33.76 kg/m .  GENERAL: NAD.  HEENT: NC/AT.  PULM: Non-labored breathing.   (CD/LD dose was 1 hour before exam)  Focused Neurologic:  MENTAL STATUS: Alert, attentive. Speech is fluent. Normal comprehension. Normal concentration. Adequate fund of knowledge.   CRANIAL NERVES: Pupils equally, round and reactive to  light. Facial movement normal. EOM full. Hearing intact to conversation.Trapezius strength intact. Palate moves symmetrically. Tongue midline.  MOTOR: 5/5 in proximal and distal muscle groups of upper and lower extremities. Tone and bulk normal.   DTRs: Intact and symmetric in biceps (actually a little brisk for age), BR, patellae.   SENSATION: Normal light touch throughout.  COORDINATION: Normal finger nose finger.  Hand opening/closing speed and amplitude normal.  STATION AND GAIT: Romberg negative. Good postural reflexes.  Casual gait has good step length, good arm-swing.  Good turn.  TREMOR: No tremor on today's exam.  This is a great improvement from my previous exam.  Right hand-dominant.    ASSESSMENT and PLAN:                                                      Assessment:    ICD-10-CM    1. Primary parkinsonism (H)  G20.C       2. Encounter for long-term (current) use of medications  Z79.899       3. Resting tremor  G25.2           Ms. Mendez is a pleasant 79-year-old woman here for follow-up regarding parkinsonian tremor.  Responding well to carbidopa/levodopa.  Given the response, I do not think we need additional testing with a dopamine scan at this point but it is something we could reconsider going forward.  For now, we will continue the current dose of carbidopa/levodopa and follow-up again in 6 months.  I did tell Lianet to let me know if she thinks we need to make any dose adjustments in the meantime.    Plan:  Continue the carbidopa/levodopa: 1.5 tablets 3 times daily.  I do not think we need to do any additional testing, given your response to the medication.  Let's plan to follow-up again in 6 months.  Feel free to let me know if you think we need to make any medication dose adjustments in the meantime.    Total Time: 25 minutes were spent with the patient and in chart review/documentation (as described above in Assessment and Plan)/coordinating the care on date of service.    Wendy  Jimenez Velasquez MD  Neurology    Dragon software used in the dictation of this note.                    Again, thank you for allowing me to participate in the care of your patient.        Sincerely,        Wendy Velasquez MD    Electronically signed

## 2025-04-15 NOTE — PROGRESS NOTES
ESTABLISHED PATIENT NEUROLOGY NOTE    DATE OF VISIT: 4/15/2025  MRN: [id number]  PATIENT NAME: Lianet Mendez  YOB: 1945    Chief Complaint   Patient presents with    Parkinson     Since the change of the dosage, there's been a great improvement.      SUBJECTIVE:                                                      HISTORY OF PRESENT ILLNESS:  Lianet is here for follow up regarding tremor.    History as previously documented by me (1.13.25):  Per chart review, there have been a coupe of appointments scheduled with the Movement Disorders clinic (one 2 days ago), and these have been cancelled.      No other reported parkinsonian symptoms per primary care exam. Primidone was offered, but declined in 11.2024. She was then referred on to Neurology.     Additional history of HTN, HLD and chronic cough.     Lianet is here with her son and daughter-in-law today.  They live with her.  I ask about the tremor and Lianet says this has been going on since last spring, family agrees.  Gradual worsening.  Affects the right hand only which is her dominant hand.  That being said fine motor movements such as sewing and stitching are okay.   She cannot tell if there has been any other change in mobility. Some balance problems due to sinuses.     DIL says she shuffles, is hunched over, at times. Sleep is restful, she does not move around a lot in her sleep. No changes in taste or smell.  Appetite is fine.  Recall is fine, maybe processing speed is a little slower than before but no concerns about memory in general.   Voice has gotten quieter per family.     Family does have some concerns about deconditioning, lack of activity  so they brought up their stationary bike from the basement for Lianet to use.  Daughter-in-law also asks about strength in her right hand and if there is something that can be done for this.  Lianet does not necessarily notice any weakness.     No known family history of  neurologic disease, no strokes or neurodegenerative processes as far as Lianet is aware.    Our plan was to try starting carbidopa/levodopa. In the meantime we have titrated the dose up to 1.5 tablets TID. I also referred Lianet for occupational therapy, our Parkinson's class here in clinic and we discussed possible Vicky scan.     Lianet is here alone today.   She did do some hand therapy. It was disappointing, she said the therapist made it seem like her tremor was not severe enough for any specific treatments.  She has looked up her own exercises at home and has been doing these.  She is looking forward to the warmer weather.  Unfortunately, her daughter was diagnosed with lymphoma so she will be staying around [facility] (they are eventually moving to [place]).  It has thus been a little bit of a stressful time but the tremor has not worsened.  She does try to avoid mealtime, but sometimes it is a little bit hard to get her carbidopa/levodopa doses and on time, occasionally misses 1.  Overall though, Lianet says she is happy with tremor control.  No problems with mobility or balance at this point.  She decided to hold off on the dopamine scan because of other medical issues she needed to address.     She does not necessarily notice wearing off in between doses of CD/LD.    CURRENT MEDICATIONS:   Current Outpatient Medications   Medication Sig Dispense Refill    albuterol (PROAIR HFA/PROVENTIL HFA/VENTOLIN HFA) 108 (90 Base) MCG/ACT inhaler Inhale 2 puffs into the lungs every 6 hours as needed for shortness of breath, wheezing or cough. 18 g 3    atorvastatin (LIPITOR) 20 MG tablet Take 1 tablet (20 mg) by mouth daily. 90 tablet 3    azelastine (ASTELIN) 0.1 % nasal spray Spray 2 sprays into both nostrils 2 times daily as needed for rhinitis. 30 mL 3    budesonide (PULMICORT) 0.5 MG/2ML neb solution Mix respule of 0.5mg/2 mL budesonide vial in 8oz normal saline sinus rinse bottle. Irrigate each nostril with  half of the bottle twice daily 120 mL 1    carbidopa-levodopa (SINEMET)  MG tablet Take 1.5 tablets by mouth 3 times daily. 135 tablet 4    cetirizine (ZYRTEC) 10 MG tablet Take 1 tablet (10 mg) by mouth daily. 90 tablet 3    fluticasone-salmeterol (WIXELA INHUB) 250-50 MCG/ACT inhaler Inhale 1 puff into the lungs 2 times daily. 60 each 2    losartan (COZAAR) 100 MG tablet Take 1 tablet (100 mg) by mouth daily. 90 tablet 3    magnesium 250 MG tablet Take 1 tablet by mouth daily      Melatonin 10 MG TABS tablet Take 10 mg by mouth nightly as needed for sleep      triamterene-HCTZ (DYAZIDE) 37.5-25 MG capsule Take 1 capsule by mouth daily. TAKE ONE CAPSULE BY MOUTH EVERY MORNING 90 capsule 3    vitamin C (ASCORBIC ACID) 1000 MG TABS 1,000 mg.      Zinc 30 MG TABS       doxycycline hyclate (VIBRAMYCIN) 100 MG capsule Take 1 capsule (100 mg) by mouth 2 times daily. 70 capsule 0     No current facility-administered medications for this visit.       RECENT DIAGNOSTIC STUDIES:   Labs: No results found for any visits on 04/15/25.      REVIEW OF SYSTEMS:                                                      10-point review of systems is negative except as mentioned above in HPI.    EXAM:                                                      Physical Exam:   Vitals: /88   Pulse 81   Wt 79 kg (174 lb 3.2 oz)   BMI 33.76 kg/m    BMI= Body mass index is 33.76 kg/m .  GENERAL: NAD.  HEENT: NC/AT.  PULM: Non-labored breathing.   (CD/LD dose was 1 hour before exam)  Focused Neurologic:  MENTAL STATUS: Alert, attentive. Speech is fluent. Normal comprehension. Normal concentration. Adequate fund of knowledge.   CRANIAL NERVES: Pupils equally, round and reactive to light. Facial movement normal. EOM full. Hearing intact to conversation.Trapezius strength intact. Palate moves symmetrically. Tongue midline.  MOTOR: 5/5 in proximal and distal muscle groups of upper and lower extremities. Tone and bulk normal.   DTRs: Intact  and symmetric in biceps (actually a little brisk for age), BR, patellae.   SENSATION: Normal light touch throughout.  COORDINATION: Normal finger nose finger.  Hand opening/closing speed and amplitude normal.  STATION AND GAIT: Romberg negative. Good postural reflexes.  Casual gait has good step length, good arm-swing.  Good turn.  TREMOR: No tremor on today's exam.  This is a great improvement from my previous exam.  Right hand-dominant.    ASSESSMENT and PLAN:                                                      Assessment:    ICD-10-CM    1. Primary parkinsonism (H)  G20.C       2. Encounter for long-term (current) use of medications  Z79.899       3. Resting tremor  G25.2           Ms. Mendez is a pleasant 79-year-old woman here for follow-up regarding parkinsonian tremor.  Responding well to carbidopa/levodopa.  Given the response, I do not think we need additional testing with a dopamine scan at this point but it is something we could reconsider going forward.  For now, we will continue the current dose of carbidopa/levodopa and follow-up again in 6 months.  I did tell Lianet to let me know if she thinks we need to make any dose adjustments in the meantime.    Plan:  Continue the carbidopa/levodopa: 1.5 tablets 3 times daily.  I do not think we need to do any additional testing, given your response to the medication.  Let's plan to follow-up again in 6 months.  Feel free to let me know if you think we need to make any medication dose adjustments in the meantime.    Total Time: 25 minutes were spent with the patient and in chart review/documentation (as described above in Assessment and Plan)/coordinating the care on date of service.    Wendy Velasquez MD  Neurology    Dragon software used in the dictation of this note.

## 2025-04-15 NOTE — NURSING NOTE
"Lianet Mendez is a 79 year old female who presents for:  Chief Complaint   Patient presents with    Parkinson     Since the change of the dosage, there's been a great improvement.         Initial Vitals:  /88   Pulse 81   Wt 79 kg (174 lb 3.2 oz)   BMI 33.76 kg/m   Estimated body mass index is 33.76 kg/m  as calculated from the following:    Height as of 4/8/25: 1.53 m (5' 0.24\").    Weight as of this encounter: 79 kg (174 lb 3.2 oz).. Body surface area is 1.83 meters squared. BP completed using cuff size: wrist cuff    Jarrod V. Dwight  "

## 2025-04-20 ENCOUNTER — HEALTH MAINTENANCE LETTER (OUTPATIENT)
Age: 80
End: 2025-04-20

## 2025-05-01 NOTE — PROGRESS NOTES
History of Present Illness - Lianet Mendez is a 79 year old female presenting in clinic today for a recheck on Patient presents with:  Follow Up    Patient with history of a chronic pansinusitis sinus polyps had full allergy testing done with Dr. Wilcox in [place]  Present Symptoms include:  and they are less nasal congestion.  Some sense of smell has come back.  Patient is quite happy with budesonide irrigations.  The Dr. Wilcox also added azelastine to her regimen.   ENVIRONMENTAL PERCUTANEOUS SKIN TESTING: ADULT       4/8/2025     2:00 PM   [facility]   [facility]   Ordering Physician Jeri   Interpreting Physician Jeri   Testing Technician Maribel   Location Arm   Time start: 14:05   Time End: 14:20   Positive Control: Histatrol*ALK 1 mg/ml 3/3   Negative Control: 50% Glycerin 0   Cat Hair*ALK (10,000 BAU/ml) 4/4   AP Dog Hair/Dander (1:100 w/v) 3/3   Dust Mite p. 30,000 AU/ml 0   Dust Mite f. (30,000 AU/ml) 3/3   Yamil (W/F in millimeters) 0   Harjinder Grass (100,000 BAU/mL) 0   Red Cedar (W/F in millimeters) 0   Maple/Garvin (W/F in millimeters) 0   Hackberry (W/F in millimeters) 0   [place] (W/F in millimeters) 0   Dyess *ALK (W/F in millimeters) 0   American Elm (W/F in millimeters) 0   [place] (W/F in millimeters) 0   [place] (W/F in millimeters) 0   Birch Mix (W/F in millimeters) 0   [place] (W/F in millimeters) 0   Oak (W/F in millimeters) 0   Cocklebur (W/F in millimeters) 0   Howes (W/F in millimeters) 0   [place] (W/F in millimeters) 0   Careless (W/F in millimeters) 0   Nettle (W/F in millimeters) 0   English Plantain (W/F in millimeters) 0   Kochia (W/F in millimeters) 0   Lamb's Quarter (W/F in millimeters) 0   Marshelder (W/F in millimeters) 0   Ragweed Mix* ALK (W/F in millimeters) 0   Russian Thistle (W/F in millimeters) 0   Sagebrush/Mugwort (W/F in millimeters) 0   Sheep Sorrel (W/F in millimeters) 0   Feather Mix* ALK (W/F in millimeters) 0   Penicillium  Mix (1:10 w/v) 0   Curvularia spicifera (1:10 w/v) 0   Epicoccum (1:10 w/v) 0   Aspergillus fumigatus (1:10 w/v): 0   Alternaria tenius (1:10 w/v) 0   H. Cladosporium (1:10 w/v) 0   Phoma herbarum (1:10 w/v) 0   Cow* ALK (W/F in millimeters) 0    Horse* ALK (W/F in millimeters) 0      My interpretation: SPT for aeroallergens performed today (April 8, 2025) with mild positive test results for cat, dog, and dust mite.  The patient had appropriate responses to positive and negative controls.     In regard to cerumen impaction that she has had in the past feels that the ear on the right is more plugged than the left.  No discharge noted.  BP Readings from Last 1 Encounters:   05/15/25 112/78       BP noted to be well controlled today in office.     Lianet IS NOT a smoker/uses chewing tobacco.        Past Medical History -   Past Medical History:   Diagnosis Date    Breast cancer (H) 1995    Essential hypertension     Melanoma (H) 2019       Current Medications -   Current Outpatient Medications:     albuterol (PROAIR HFA/PROVENTIL HFA/VENTOLIN HFA) 108 (90 Base) MCG/ACT inhaler, Inhale 2 puffs into the lungs every 6 hours as needed for shortness of breath, wheezing or cough., Disp: 18 g, Rfl: 3    atorvastatin (LIPITOR) 20 MG tablet, Take 1 tablet (20 mg) by mouth daily., Disp: 90 tablet, Rfl: 3    azelastine (ASTELIN) 0.1 % nasal spray, Spray 2 sprays into both nostrils 2 times daily as needed for rhinitis., Disp: 30 mL, Rfl: 3    budesonide (PULMICORT) 0.5 MG/2ML neb solution, Mix respule of 0.5mg/2 mL budesonide vial in 8oz normal saline sinus rinse bottle. Irrigate each nostril with half of the bottle twice daily, Disp: 120 mL, Rfl: 1    carbidopa-levodopa (SINEMET)  MG tablet, Take 1.5 tablets by mouth 3 times daily., Disp: 135 tablet, Rfl: 4    cetirizine (ZYRTEC) 10 MG tablet, Take 1 tablet (10 mg) by mouth daily., Disp: 90 tablet, Rfl: 3    doxycycline hyclate (VIBRAMYCIN) 100 MG capsule, Take 1 capsule  (100 mg) by mouth 2 times daily., Disp: 70 capsule, Rfl: 0    fluticasone-salmeterol (WIXELA INHUB) 250-50 MCG/ACT inhaler, Inhale 1 puff into the lungs 2 times daily., Disp: 60 each, Rfl: 2    losartan (COZAAR) 100 MG tablet, Take 1 tablet (100 mg) by mouth daily., Disp: 90 tablet, Rfl: 3    magnesium 250 MG tablet, Take 1 tablet by mouth daily, Disp: , Rfl:     Melatonin 10 MG TABS tablet, Take 10 mg by mouth nightly as needed for sleep, Disp: , Rfl:     triamterene-HCTZ (DYAZIDE) 37.5-25 MG capsule, Take 1 capsule by mouth daily. TAKE ONE CAPSULE BY MOUTH EVERY MORNING, Disp: 90 capsule, Rfl: 3    vitamin C (ASCORBIC ACID) 1000 MG TABS, 1,000 mg., Disp: , Rfl:     Zinc 30 MG TABS, , Disp: , Rfl:     Allergies -   Allergies   Allergen Reactions    Cats     Dogs        Social History -   Social History     Socioeconomic History    Marital status: Single   Tobacco Use    Smoking status: Never    Smokeless tobacco: Never   Vaping Use    Vaping status: Never Used   Substance and Sexual Activity    Alcohol use: Yes     Comment: Social    Drug use: Never    Sexual activity: Not Currently     Partners: Male     Birth control/protection: Female Surgical   Other Topics Concern    Parent/sibling w/ CABG, MI or angioplasty before 65F 55M? No   Social History Narrative    April 8, 2025-lives with son and daughter-in-law        ENVIRONMENTAL HISTORY: [place] The [place] is heated with a forced air. They does have central air conditioning. The patient's bedroom is furnished with carpeting in bedroom and fabric window coverings.  Pets inside the house include 5 cat(s) and 6 dog(s), 12 horse(s), 5 cows, several sheep, goats, chickens, turkeys, and ducks. There is no history of cockroach or mice infestation. There is/are 0 smokers in the house.  The house does not have a damp basement.      Social Drivers of Health     Financial Resource Strain: Low Risk  (11/18/2024)    Financial Resource  Strain     Within the past 12 months, have you or your family members you live with been unable to get utilities (heat, electricity) when it was really needed?: No   Food Insecurity: Low Risk  (11/18/2024)    Food Insecurity     Within the past 12 months, did you worry that your food would run out before you got money to buy more?: No     Within the past 12 months, did the food you bought just not last and you didn t have money to get more?: No   Transportation Needs: Low Risk  (11/18/2024)    Transportation Needs     Within the past 12 months, has lack of transportation kept you from medical appointments, getting your medicines, non-medical meetings or appointments, work, or from getting things that you need?: No   Physical Activity: Insufficiently Active (11/18/2024)    Exercise Vital Sign     Days of Exercise per Week: 1 day     Minutes of Exercise per Session: 10 min   Stress: No Stress Concern Present (11/18/2024)    [place] [employer] - Occupational Stress Questionnaire     Feeling of Stress : Not at all   Social Connections: Unknown (11/18/2024)    Social Connection and Isolation Panel [NHANES]     Frequency of Social Gatherings with Friends and Family: Patient declined   Interpersonal Safety: Low Risk  (11/18/2024)    Interpersonal Safety     Do you feel physically and emotionally safe where you currently live?: Yes     Within the past 12 months, have you been hit, slapped, kicked or otherwise physically hurt by someone?: No     Within the past 12 months, have you been humiliated or emotionally abused in other ways by your partner or ex-partner?: No   Housing Stability: Low Risk  (11/18/2024)    Housing Stability     Do you have housing? : Yes     Are you worried about losing your housing?: No       Family History -   Family History   Problem Relation Age of Onset    Osteoporosis Mother     Hypertension Father     Prostate Cancer Father     Colon Cancer Paternal Grandfather         Review of Systems - As per HPI and PMHx, otherwise review of system review of the head and neck negative. Otherwise 10+ review of system is negative    Physical Exam  /78 (BP Location: Right arm, Patient Position: Sitting, Cuff Size: Adult Regular)   Pulse 95   Temp 97.2  F (36.2  C) (Temporal)   Ht 1.524 m (5')   Wt 77.3 kg (170 lb 8 oz)   BMI 33.30 kg/m    BMI: Body mass index is 33.3 kg/m .    General - The patient is well nourished and well developed, and appears to have good nutritional status.  Alert and oriented to person and place, answers questions and cooperates with examination appropriately.    SKIN - No suspicious lesions or rashes.  Respiration - No respiratory distress.  Head and Face - Normocephalic and atraumatic, with no gross asymmetry noted of the contour of the facial features.  The facial nerve is intact, with strong symmetric movements.    Voice and Breathing - The patient was breathing comfortably without the use of accessory muscles. The patients voice was clear and strong, and had appropriate pitch and quality.    Ears - Bilateral pinna and EACs with normal appearing overlying skin. Tympanic membrane intact with good mobility on pneumatic otoscopy bilaterally. Bony landmarks of the ossicular chain are normal. The tympanic membranes are normal in appearance. No retraction, perforation, or masses.  No fluid or purulence was seen in the external canal or the middle ear.  Exam was after cerumen disimpaction.    Eyes - Extraocular movements intact.  Sclera were not icteric or injected, conjunctiva were pink and moist.    Mouth - Examination of the oral cavity showed pink, healthy oral mucosa. No lesions or ulcerations noted.  The tongue was mobile and midline, and the dentition were in good condition.      Throat - The walls of the oropharynx were smooth, pink, moist, symmetric, and had no lesions or ulcerations.  The tonsillar pillars and soft palate were symmetric. . The uvula  was midline on elevation.    Neck - Normal midline excursion of the laryngotracheal complex during swallowing.  Full range of motion on passive movement.  Palpation of the occipital, submental, submandibular, internal jugular chain, and supraclavicular nodes did not demonstrate any abnormal lymph nodes or masses.  The carotid pulse was palpable bilaterally.  Palpation of the thyroid was soft and smooth, with no nodules or goiter appreciated.  The trachea was mobile and midline.    Nose - External contour is symmetric, no gross deflection or scars.  Nasal mucosa is pink and moist with no abnormal mucus.  The septum was midline and non-obstructive, turbinates of normal size and position.  No polyps, masses, or purulence noted on examination.    Neuro - Nonfocal neuro exam is normal, CN 2 through 12 intact, normal gait and muscle tone.      Performed in clinic today:  To further evaluate the nasal cavity, I performed rigid nasal endoscopy.  I first sprayed the nasal cavity bilaterally with a mix of lidocaine and neosynephrine.  I then began on the left side using a 2.7mm, 30 degree rigid nasal endoscope.  The septum was straight and the nasal airway was open.  No abnormal secretions, purulence, however some intranasal nonobstructive dry polyps were noted. The left middle turbinate and middle meatus were clearly visualized and some edema is appreciated involving middle meatus little bit of polyps dry.  Looking up, the olfactory cleft was unobstructed.  Going further back, the sphenoethmoid recess was normal in appearance, with healthy appearing mucosa on the face of the sphenoid.  The nasopharynx was unremarkable, and the eustachian tube opening on this side was unobstructed.    I then turned my attention to the right side.  Once again, the septum was straight, and the airway was open.  No abnormal secretions, purulence, but intranasal dry nonobstructive polyps were noted.  The right middle turbinate and middle meatus  were clearly visualized and edematous slightly polypoid without purulence in appearance.  Looking up, the olfactory cleft was unobstructed.  Going further back the right sphenoethmoid recess was normal in appearance, and eustachian tube opening was unobstructed.   Purple - [id number] Mytamed  Ears - On examination of the ears, I found that both ears were impacted with cerumen.  , I positioned the patient in the examination chair in a semi-supine position. I used the magnified speculum /binocular surgical microscope to perform cerumen removal.  On the right side, I began by using a cerumen loop to gently lift the edges of the cerumen mass away from the walls of the external canal.  Once I did this, I was able to use curette to  pull/suction away fragments of wax and debris. I removed all the wax and debri.  The tympanic membrane was intact, no sign of perforation or middle ear effusion., and On the left side once again using the magnified speculum/ binocular surgical microscope to perform cerumen removal.  I began by using a cerumen loop to gently lift the edges of the cerumen mass away from the walls of the external canal.  Once I did this, I was able to use curette to pull/suction away fragments of wax and debris. I removed all the wax and debri. The tympanic membrane was intact, no sign of perforation or middle ear effusion.      A/P - Lianet Mendez is a 79 year old female Patient presents with:  Follow Up    Patient tolerated cerumen disimpaction well ears feel fine.  As far as the nose concerned she is improving with topical treatments.  She is topically treating her allergies and keeping polyps of B.  Since symptomatically she is much better we can continue the same regimen.    Lianet should follow up in 3 months.      At Lianet next appointment they will not need a hearing test.      Avery Kwok MD

## 2025-05-15 ENCOUNTER — OFFICE VISIT (OUTPATIENT)
Dept: OTOLARYNGOLOGY | Facility: CLINIC | Age: 80
End: 2025-05-15
Payer: COMMERCIAL

## 2025-05-15 VITALS
SYSTOLIC BLOOD PRESSURE: 112 MMHG | BODY MASS INDEX: 33.47 KG/M2 | HEIGHT: 60 IN | HEART RATE: 95 BPM | WEIGHT: 170.5 LBS | DIASTOLIC BLOOD PRESSURE: 78 MMHG | TEMPERATURE: 97.2 F

## 2025-05-15 DIAGNOSIS — J32.9 SINUSITIS WITH NASAL POLYPS: ICD-10-CM

## 2025-05-15 DIAGNOSIS — H61.23 BILATERAL IMPACTED CERUMEN: ICD-10-CM

## 2025-05-15 DIAGNOSIS — J30.81 ALLERGIC RHINITIS DUE TO ANIMALS: Primary | ICD-10-CM

## 2025-05-15 DIAGNOSIS — J33.9 SINUSITIS WITH NASAL POLYPS: ICD-10-CM

## 2025-05-15 RX ORDER — BUDESONIDE 0.5 MG/2ML
INHALANT ORAL
Qty: 120 ML | Refills: 5 | Status: SHIPPED | OUTPATIENT
Start: 2025-05-15

## 2025-05-15 ASSESSMENT — PAIN SCALES - GENERAL: PAINLEVEL_OUTOF10: NO PAIN (0)

## 2025-05-15 NOTE — LETTER
5/15/2025      Lianet Mendez  [id number] [address] [facility] [address]      Dear Colleague,    Thank you for referring your patient, Lianet Mendez, to the [facility]. Please see a copy of my visit note below.    History of Present Illness - Lianet Mendez is a 79 year old female presenting in clinic today for a recheck on Patient presents with:  Follow Up    Patient with history of a chronic pansinusitis sinus polyps had full allergy testing done with Dr. Wilcox in [place]  Present Symptoms include:  and they are less nasal congestion.  Some sense of smell has come back.  Patient is quite happy with budesonide irrigations.  The Dr. Wilcox also added azelastine to her regimen.   ENVIRONMENTAL PERCUTANEOUS SKIN TESTING: ADULT       4/8/2025     2:00 PM   [facility]   Consent [facility]   Ordering Physician Jeri   Interpreting Physician Jeri   Testing Technician Maribel   Location Arm   Time start: 14:05   Time End: 14:20   Positive Control: Histatrol*ALK 1 mg/ml 3/3   Negative Control: 50% Glycerin 0   Cat Hair*ALK (10,000 BAU/ml) 4/4   AP Dog Hair/Dander (1:100 w/v) 3/3   Dust Mite p. 30,000 AU/ml 0   Dust Mite f. (30,000 AU/ml) 3/3   Yamil (W/F in millimeters) 0   Harjinder Grass (100,000 BAU/mL) 0   Red Cedar (W/F in millimeters) 0   Maple/[place] (W/F in millimeters) 0   Hackberry (W/F in millimeters) 0   [place] (W/F in millimeters) 0   [place] *ALK (W/F in millimeters) 0   American Elm (W/F in millimeters) 0   [place] (W/F in millimeters) 0   [place] (W/F in millimeters) 0   Birch Mix (W/F in millimeters) 0   Linwood (W/F in millimeters) 0   Oak (W/F in millimeters) 0   Cocklebur (W/F in millimeters) 0   [place] (W/F in millimeters) 0   [place] (W/F in millimeters) 0   Care[place] (W/F in millimeters) 0   Nettle (W/F in millimeters) 0   English Plantain (W/F in millimeters) 0   Kochia (W/F in millimeters) 0   Lamb's Quarter (W/F in millimeters) 0   Marshelder (W/F  in millimeters) 0   Ragweed Mix* ALK (W/F in millimeters) 0   Russian Thistle (W/F in millimeters) 0   Sagebrush/Mugwort (W/F in millimeters) 0   Sheep Sorrel (W/F in millimeters) 0   Feather Mix* ALK (W/F in millimeters) 0   Penicillium Mix (1:10 w/v) 0   Curvularia spicifera (1:10 w/v) 0   Epicoccum (1:10 w/v) 0   Aspergillus fumigatus (1:10 w/v): 0   Alternaria tenius (1:10 w/v) 0   H. Cladosporium (1:10 w/v) 0   Phoma herbarum (1:10 w/v) 0   Cow* ALK (W/F in millimeters) 0    Horse* ALK (W/F in millimeters) 0      My interpretation: SPT for aeroallergens performed today (April 8, 2025) with mild positive test results for cat, dog, and dust mite.  The patient had appropriate responses to positive and negative controls.     In regard to cerumen impaction that she has had in the past feels that the ear on the right is more plugged than the left.  No discharge noted.  BP Readings from Last 1 Encounters:   05/15/25 112/78       BP noted to be well controlled today in office.     Lianet IS NOT a smoker/uses chewing tobacco.        Past Medical History -   Past Medical History:   Diagnosis Date     Breast cancer (H) 1995     Essential hypertension      Melanoma (H) 2019       Current Medications -   Current Outpatient Medications:      albuterol (PROAIR HFA/PROVENTIL HFA/VENTOLIN HFA) 108 (90 Base) MCG/ACT inhaler, Inhale 2 puffs into the lungs every 6 hours as needed for shortness of breath, wheezing or cough., Disp: 18 g, Rfl: 3     atorvastatin (LIPITOR) 20 MG tablet, Take 1 tablet (20 mg) by mouth daily., Disp: 90 tablet, Rfl: 3     azelastine (ASTELIN) 0.1 % nasal spray, Spray 2 sprays into both nostrils 2 times daily as needed for rhinitis., Disp: 30 mL, Rfl: 3     budesonide (PULMICORT) 0.5 MG/2ML neb solution, Mix respule of 0.5mg/2 mL budesonide vial in 8oz normal saline sinus rinse bottle. Irrigate each nostril with half of the bottle twice daily, Disp: 120 mL, Rfl: 1     carbidopa-levodopa (SINEMET)   MG tablet, Take 1.5 tablets by mouth 3 times daily., Disp: 135 tablet, Rfl: 4     cetirizine (ZYRTEC) 10 MG tablet, Take 1 tablet (10 mg) by mouth daily., Disp: 90 tablet, Rfl: 3     doxycycline hyclate (VIBRAMYCIN) 100 MG capsule, Take 1 capsule (100 mg) by mouth 2 times daily., Disp: 70 capsule, Rfl: 0     fluticasone-salmeterol (WIXELA INHUB) 250-50 MCG/ACT inhaler, Inhale 1 puff into the lungs 2 times daily., Disp: 60 each, Rfl: 2     losartan (COZAAR) 100 MG tablet, Take 1 tablet (100 mg) by mouth daily., Disp: 90 tablet, Rfl: 3     magnesium 250 MG tablet, Take 1 tablet by mouth daily, Disp: , Rfl:      Melatonin 10 MG TABS tablet, Take 10 mg by mouth nightly as needed for sleep, Disp: , Rfl:      triamterene-HCTZ (DYAZIDE) 37.5-25 MG capsule, Take 1 capsule by mouth daily. TAKE ONE CAPSULE BY MOUTH EVERY MORNING, Disp: 90 capsule, Rfl: 3     vitamin C (ASCORBIC ACID) 1000 MG TABS, 1,000 mg., Disp: , Rfl:      Zinc 30 MG TABS, , Disp: , Rfl:     Allergies -   Allergies   Allergen Reactions     Cats      Dogs        Social History -   Social History     Socioeconomic History     Marital status: Single   Tobacco Use     Smoking status: Never     Smokeless tobacco: Never   Vaping Use     Vaping status: Never Used   Substance and Sexual Activity     Alcohol use: Yes     Comment: Social     Drug use: Never     Sexual activity: Not Currently     Partners: Male     Birth control/protection: Female Surgical   Other Topics Concern     Parent/sibling w/ CABG, MI or angioplasty before 65F 55M? No   Social History Narrative    April 8, 2025-lives with son and daughter-in-law        ENVIRONMENTAL HISTORY: [place] is heated with a forced air. They does have central air conditioning. The patient's bedroom is furnished with carpeting in bedroom and fabric window coverings.  Pets inside the house include 5 cat(s) and 6 dog(s), 12 horse(s), 5 cows, several sheep,  goats, chickens, turkeys, and ducks. There is no history of cockroach or mice infestation. There is/are 0 smokers in the house.  The house does not have a damp basement.      Social Drivers of Health     Financial Resource Strain: Low Risk  (11/18/2024)    Financial Resource Strain      Within the past 12 months, have you or your family members you live with been unable to get utilities (heat, electricity) when it was really needed?: No   Food Insecurity: Low Risk  (11/18/2024)    Food Insecurity      Within the past 12 months, did you worry that your food would run out before you got money to buy more?: No      Within the past 12 months, did the food you bought just not last and you didn t have money to get more?: No   Transportation Needs: Low Risk  (11/18/2024)    Transportation Needs      Within the past 12 months, has lack of transportation kept you from medical appointments, getting your medicines, non-medical meetings or appointments, work, or from getting things that you need?: No   Physical Activity: Insufficiently Active (11/18/2024)    Exercise Vital Sign      Days of Exercise per Week: 1 day      Minutes of Exercise per Session: 10 min   Stress: No Stress Concern Present (11/18/2024)    [employer] - Occupational Stress Questionnaire      Feeling of Stress : Not at all   Social Connections: Unknown (11/18/2024)    Social Connection and Isolation Panel [NHANES]      Frequency of Social Gatherings with Friends and Family: Patient declined   Interpersonal Safety: Low Risk  (11/18/2024)    Interpersonal Safety      Do you feel physically and emotionally safe where you currently live?: Yes      Within the past 12 months, have you been hit, slapped, kicked or otherwise physically hurt by someone?: No      Within the past 12 months, have you been humiliated or emotionally abused in other ways by your partner or ex-partner?: No   Housing Stability: Low Risk  (11/18/2024)    Housing  Stability      Do you have housing? : Yes      Are you worried about losing your housing?: No       Family History -   Family History   Problem Relation Age of Onset     Osteoporosis Mother      Hypertension Father      Prostate Cancer Father      Colon Cancer Paternal Grandfather        Review of Systems - As per HPI and PMHx, otherwise review of system review of the head and neck negative. Otherwise 10+ review of system is negative    Physical Exam  /78 (BP Location: Right arm, Patient Position: Sitting, Cuff Size: Adult Regular)   Pulse 95   Temp 97.2  F (36.2  C) (Temporal)   Ht 1.524 m (5')   Wt 77.3 kg (170 lb 8 oz)   BMI 33.30 kg/m    BMI: Body mass index is 33.3 kg/m .    General - The patient is well nourished and well developed, and appears to have good nutritional status.  Alert and oriented to person and place, answers questions and cooperates with examination appropriately.    SKIN - No suspicious lesions or rashes.  Respiration - No respiratory distress.  Head and Face - Normocephalic and atraumatic, with no gross asymmetry noted of the contour of the facial features.  The facial nerve is intact, with strong symmetric movements.    Voice and Breathing - The patient was breathing comfortably without the use of accessory muscles. The patients voice was clear and strong, and had appropriate pitch and quality.    Ears - Bilateral pinna and EACs with normal appearing overlying skin. Tympanic membrane intact with good mobility on pneumatic otoscopy bilaterally. Bony landmarks of the ossicular chain are normal. The tympanic membranes are normal in appearance. No retraction, perforation, or masses.  No fluid or purulence was seen in the external canal or the middle ear.  Exam was after cerumen disimpaction.    Eyes - Extraocular movements intact.  Sclera were not icteric or injected, conjunctiva were pink and moist.    Mouth - Examination of the oral cavity showed pink, healthy oral mucosa. No  lesions or ulcerations noted.  The tongue was mobile and midline, and the dentition were in good condition.      Throat - The walls of the oropharynx were smooth, pink, moist, symmetric, and had no lesions or ulcerations.  The tonsillar pillars and soft palate were symmetric. . The uvula was midline on elevation.    Neck - Normal midline excursion of the laryngotracheal complex during swallowing.  Full range of motion on passive movement.  Palpation of the occipital, submental, submandibular, internal jugular chain, and supraclavicular nodes did not demonstrate any abnormal lymph nodes or masses.  The carotid pulse was palpable bilaterally.  Palpation of the thyroid was soft and smooth, with no nodules or goiter appreciated.  The trachea was mobile and midline.    Nose - External contour is symmetric, no gross deflection or scars.  Nasal mucosa is pink and moist with no abnormal mucus.  The septum was midline and non-obstructive, turbinates of normal size and position.  No polyps, masses, or purulence noted on examination.    Neuro - Nonfocal neuro exam is normal, CN 2 through 12 intact, normal gait and muscle tone.      Performed in clinic today:  To further evaluate the nasal cavity, I performed rigid nasal endoscopy.  I first sprayed the nasal cavity bilaterally with a mix of lidocaine and neosynephrine.  I then began on the left side using a 2.7mm, 30 degree rigid nasal endoscope.  The septum was straight and the nasal airway was open.  No abnormal secretions, purulence, however some intranasal nonobstructive dry polyps were noted. The left middle turbinate and middle meatus were clearly visualized and some edema is appreciated involving middle meatus little bit of polyps dry.  Looking up, the olfactory cleft was unobstructed.  Going further back, the sphenoethmoid recess was normal in appearance, with healthy appearing mucosa on the face of the sphenoid.  The nasopharynx was unremarkable, and the eustachian  tube opening on this side was unobstructed.    I then turned my attention to the right side.  Once again, the septum was straight, and the airway was open.  No abnormal secretions, purulence, but intranasal dry nonobstructive polyps were noted.  The right middle turbinate and middle meatus were clearly visualized and edematous slightly polypoid without purulence in appearance.  Looking up, the olfactory cleft was unobstructed.  Going further back the right sphenoethmoid recess was normal in appearance, and eustachian tube opening was unobstructed.   Purple - [id number] Mytamed  Ears - On examination of the ears, I found that both ears were impacted with cerumen.  , I positioned the patient in the examination chair in a semi-supine position. I used the magnified speculum /binocular surgical microscope to perform cerumen removal.  On the right side, I began by using a cerumen loop to gently lift the edges of the cerumen mass away from the walls of the external canal.  Once I did this, I was able to use curette to  pull/suction away fragments of wax and debris. I removed all the wax and debri.  The tympanic membrane was intact, no sign of perforation or middle ear effusion., and On the left side once again using the magnified speculum/ binocular surgical microscope to perform cerumen removal.  I began by using a cerumen loop to gently lift the edges of the cerumen mass away from the walls of the external canal.  Once I did this, I was able to use curette to pull/suction away fragments of wax and debris. I removed all the wax and debri. The tympanic membrane was intact, no sign of perforation or middle ear effusion.      A/P - Lianet QUESADA Andrea is a 79 year old female Patient presents with:  Follow Up    Patient tolerated cerumen disimpaction well ears feel fine.  As far as the nose concerned she is improving with topical treatments.  She is topically treating her allergies and keeping polyps of B.  Since symptomatically  she is much better we can continue the same regimen.    Lianet should follow up in 3 months.      At Lianet next appointment they will not need a hearing test.      Avery Kwok MD         Again, thank you for allowing me to participate in the care of your patient.        Sincerely,        Avery Kwok MD, MD    Electronically signed

## 2025-05-20 NOTE — PROGRESS NOTES
Does Lianet have home oxygen?  [address] [address]  Phone: [phone]    Patient:  Lianet Mendez, Date of birth 1945  Date of Visit:  05/21/2025  Referring Provider Michael Zambrano      Assessment & Plan      Suspected Asthma  Chest wall lymphadenopathy  Sub-centimeter pulmonary nodules    Her cough and wheeze have improved with Wixela 250 mcg twice a day.  I will try decreasing the dose to 100 mcg twice a day and see if it still manages her respiratory symptoms.    We talked for a while about the lymphadenopathy in her chest wall.  These are just above 1 cm in diameter.  I suspect that they are related to her ruptured right breast implants.  However, she also does have a history of breast cancer.  I have set her up for a follow-up chest CT scan with contrast 3 months after the previous 1.  I am not certain that breast ultrasound would be a good modality to follow these in the future since the lymph nodes were any been noted on the last report.    She is going to discuss this with her primary care physician as well when she has a visit with Dr. Zambrano.    Her subcentimeter pulmonary nodules are very small, and do not require any follow-up at the moment.    Medical Decision Making      I spent a total of 33 minutes on the day of the visit.   Time spent by me today doing chart review, history and exam, documentation and further activities per the note       Cj Malloy MD                Today's visit note:     Chief Complaint: RECHECK (Recheck cough, f/up CT 2/25/25)      HISTORY OF PRESENT ILLNESS:    Lianet Mendez is a 79 year old year old female who is being seen for cough and lymphadenopathy.    She was planning to move to [place], but unfortunately her daughter-in-law was diagnosed with lymphoma.  She has been getting treatments at [facility] and has had good response.  However the family has decided to stay here in  [place] for the time being.  Lianet has been getting set up with a handful of doctors working on issues such as nasal congestion, cough, ruptured right breast implant, and her general health care.    I had ordered a chest CT scan to examine her cough and noted a right ruptured breast implant.  There is also lymphadenopathy at the spot of the mastectomy.  She has seen a plastic surgeon, and the results of that visit were that removal of the breast implant can happen on a nonurgent basis.  A right breast ultrasound did not demonstrate any significant lymphadenopathy, but at the same time no lymph nodes were documented on the image.    On another note, she started Wixela 250 mcg twice a day.  Her cough and wheeze have completely gone away.  She has been working with ENT and had a sinus CT scan demonstrating inflammation in her sinus cavities.  She has been started on intranasal budesonide, though symptoms are better.  She thinks she might be allergic to the dog and a cat in the house.             Past Medical and Surgical History:     Past Medical History:   Diagnosis Date    Breast cancer (H) 1995    Essential hypertension     Melanoma (H) 2019     Past Surgical History:   Procedure Laterality Date    BIOPSY  2019    BREAST SURGERY      COLECTOMY      polyps.    COLONOSCOPY N/A 08/12/2019    Procedure: COLONOSCOPY;  Surgeon: Mario Alberto Villanueva MD;  Location:  GI    masectomy      ORTHOPEDIC SURGERY  2020    Hip replacement           Family History:     Family History   Problem Relation Age of Onset    Osteoporosis Mother     Hypertension Father     Prostate Cancer Father     Colon Cancer Paternal Grandfather               Social History:     Social History     Socioeconomic History    Marital status: Single     Spouse name: Not on file    Number of children: Not on file    Years of education: Not on file    Highest education level: Not on file   Occupational History    Not on file   Tobacco Use    Smoking  status: Never    Smokeless tobacco: Never   Vaping Use    Vaping status: Never Used   Substance and Sexual Activity    Alcohol use: Yes     Comment: Social    Drug use: Never    Sexual activity: Not Currently     Partners: Male     Birth control/protection: Female Surgical   Other Topics Concern    Parent/sibling w/ CABG, MI or angioplasty before 65F 55M? No   Social History Narrative    April 8, 2025-lives with son and daughter-in-law        ENVIRONMENTAL HISTORY: [place] home is heated with a forced air. They does have central air conditioning. The patient's bedroom is furnished with carpeting in bedroom and fabric window coverings.  Pets inside the house include 5 cat(s) and 6 dog(s), 12 horse(s), 5 cows, several sheep, goats, chickens, turkeys, and ducks. There is no history of cockroach or mice infestation. There is/are 0 smokers in the house.  The house does not have a damp basement.      Social Drivers of Health     Financial Resource Strain: Low Risk  (11/18/2024)    Financial Resource Strain     Within the past 12 months, have you or your family members you live with been unable to get utilities (heat, electricity) when it was really needed?: No   Food Insecurity: Low Risk  (11/18/2024)    Food Insecurity     Within the past 12 months, did you worry that your food would run out before you got money to buy more?: No     Within the past 12 months, did the food you bought just not last and you didn t have money to get more?: No   Transportation Needs: Low Risk  (11/18/2024)    Transportation Needs     Within the past 12 months, has lack of transportation kept you from medical appointments, getting your medicines, non-medical meetings or appointments, work, or from getting things that you need?: No   Physical Activity: Insufficiently Active (11/18/2024)    Exercise Vital Sign     Days of Exercise per Week: 1 day     Minutes of Exercise per Session: 10 min    Stress: No Stress Concern Present (11/18/2024)    [employer] [place] [employer] - Occupational Stress Questionnaire     Feeling of Stress : Not at all   Social Connections: Unknown (11/18/2024)    Social Connection and Isolation Panel [NHANES]     Frequency of Communication with Friends and Family: Not on file     Frequency of Social Gatherings with Friends and Family: Patient declined     Attends Qu[employer] Services: Not on file     Active Member of Clubs or Organizations: Not on file     Attends Club or Organization Meetings: Not on file     Marital Status: Not on file   Interpersonal Safety: Low Risk  (11/18/2024)    Interpersonal Safety     Do you feel physically and emotionally safe where you currently live?: Yes     Within the past 12 months, have you been hit, slapped, kicked or otherwise physically hurt by someone?: No     Within the past 12 months, have you been humiliated or emotionally abused in other ways by your partner or ex-partner?: No   Housing Stability: Low Risk  (11/18/2024)    Housing Stability     Do you have housing? : Yes     Are you worried about losing your housing?: No            Medications:     Current Outpatient Medications   Medication Sig Dispense Refill    albuterol (PROAIR HFA/PROVENTIL HFA/VENTOLIN HFA) 108 (90 Base) MCG/ACT inhaler Inhale 2 puffs into the lungs every 6 hours as needed for shortness of breath, wheezing or cough. 18 g 3    atorvastatin (LIPITOR) 20 MG tablet Take 1 tablet (20 mg) by mouth daily. 90 tablet 3    azelastine (ASTELIN) 0.1 % nasal spray Spray 2 sprays into both nostrils 2 times daily as needed for rhinitis. 30 mL 3    budesonide (PULMICORT) 0.5 MG/2ML neb solution Mix respule of 0.5mg/2 mL budesonide vial in 8oz normal saline sinus rinse bottle. Irrigate each nostril with half of the bottle twice daily 120 mL 5    carbidopa-levodopa (SINEMET)  MG tablet Take 1.5 tablets by mouth 3 times daily. 135 tablet 4    cetirizine (ZYRTEC) 10 MG  tablet Take 1 tablet (10 mg) by mouth daily. 90 tablet 3    fluticasone-salmeterol (WIXELA INHUB) 250-50 MCG/ACT inhaler Inhale 1 puff into the lungs 2 times daily. 60 each 2    losartan (COZAAR) 100 MG tablet Take 1 tablet (100 mg) by mouth daily. 90 tablet 3    magnesium 250 MG tablet Take 1 tablet by mouth daily      Melatonin 10 MG TABS tablet Take 10 mg by mouth nightly as needed for sleep      triamterene-HCTZ (DYAZIDE) 37.5-25 MG capsule Take 1 capsule by mouth daily. TAKE ONE CAPSULE BY MOUTH EVERY MORNING 90 capsule 3    vitamin C (ASCORBIC ACID) 1000 MG TABS 1,000 mg.      Zinc 30 MG TABS       doxycycline hyclate (VIBRAMYCIN) 100 MG capsule Take 1 capsule (100 mg) by mouth 2 times daily. 70 capsule 0     No current facility-administered medications for this visit.            Review of Systems:       A complete review of systems was otherwise negative except as noted in the HPI.      PHYSICAL EXAM:  /74   Pulse 110   Temp (!) 96.7  F (35.9  C) (Temporal)   Resp 14   Wt 78 kg (172 lb)   SpO2 96%   BMI 33.59 kg/m       General: Comfortable, No apparent distress  Eyes: Anicteric  Ears: Hearing grossly normal  Mouth: Oral mucosa is moist, without any lesions. No oropharyngeal exudate.  Neck: supple, no thyromegaly  Lymphatics: No cervical or supraclavicular nodes  Respiratory: Good air movement. No crackles. No rhonchi. No wheezes  Cardiac: RRR, normal S1, S2. No murmurs.   Abdomen: Soft, NT/ND  Musculoskeletal: Extremities normal. No clubbing. No cyanosis. No edema.  Skin: No rash on limited exam  Neuro: Normal mentation. Normal speech.  Psych:Normal affect           Data:   All laboratory and imaging data reviewed.      PFT: 2/2025      PFT Interpretation:  No airflow obstruction.  Mild gas trapping.  Normal diffusion capacity.  Valid Maneuver    Breast Ultrasound:   BREAST DENSITY: There are scattered areas of fibroglandular density.     DIAGNOSTIC LEFT MAMMOGRAM: Stable mammographic appearance  of the left  breast. No concerning mammographic observation.     DIAGNOSTIC RIGHT ULTRASOUND: Sonographic features consistent with  implant rupture. Free silicone noted in the right axilla and right  subclavian region which corresponds to the observations on chest CT  2/25/2025.                                                                        IMPRESSION:  1. No concerning mammographic observation in the left breast.  2. Sonographic features consistent with implant rupture with free  silicone in the right axilla and subclavian region corresponding to  observations on CT chest 2/25/2025.     ACR BI-RADS CATEGORY: 2 - Benign.       RECOMMENDED FOLLOW-UP:  1. Surgical consultation regarding right implant rupture.  2. Annual screening mammography of left breast.         Chest CT: I have reviewed the chest CT images from February 25, 2025 and agree with the radiologist interpretation below:  LUNGS AND PLEURA: No focal consolidation or pleural effusion. A few scattered small pulmonary nodules, the largest in the left lower lobe measuring 4 mm (4/179).     MEDIASTINUM/AXILLAE: Subcentimeter scattered thyroid nodules. Right breast prosthesis, which appears partially internally ruptured. Multiple enlarged right internal mammary and right subpectoral lymph nodes with reference examples on series 3:  - Right mid subpectoral, 1.2 cm, image 25   - Right internal mammary, 1.3 cm, image 56  - Superior right subpectoral, 0.9 cm, image 12     Aortic valve calcification.     CORONARY ARTERY CALCIFICATION: Mild.     UPPER ABDOMEN: Cholelithiasis. Right hepatic lobe posterior pericapsular calcification measuring 1 cm. Subcentimeter left hepatic lobe hypodensity (3/112). Indeterminate left adrenal gland 1.8 cm nodule (3/140).     MUSCULOSKELETAL: No aggressive osseous lesion.                                                                      IMPRESSION:   1.  No acute pulmonary abnormality.  2.  Multiple enlarged right internal  mammary and subpectoral lymph nodes concerning for metastatic disease given history of breast cancer. Recommend dedicated breast/axillary ultrasound.  3.  Indeterminate left adrenal gland 1.8 cm nodule and subcentimeter left hepatic lobe hypodensity. Consider correlation with any outside imaging, or further characterization with MRI as clinically indicated.  4.  Few scattered small pulmonary nodules measuring up to 4 mm.     Sinus CT:  FRONTAL SINUSES: Near complete opacification of the right frontal sinus     ETHMOID SINUSES: Near complete nonaggressive opacification of the right greater than left ethmoid air cells.     SPHENOID SINUSES: Moderate nonaggressive mucosal thickening. The in situ secretions.      MAXILLARY SINUSES: Near complete nonaggressive opacification of the right greater than left maxillary sinuses The floors of the maxillary sinuses are intact.     NASAL CAVITY/SKULL BASE: Leftward spurring of the nasal septum. Unremarkable nasal turbinates. The cribriform plate is intact and symmetric. The anterior clinoid processes are pneumatized.     PARANASAL SINUS DRAINAGE PATHWAYS:  Apposition of the paranasal sinus drainage pathways     NON-SINUS STRUCTURES: No abnormality of the visualized orbits or intracranial compartment.                                                                      IMPRESSION:     1.  Nonaggressive paranasal sinus mucosal thickening with near complete opacification of the right frontal, right greater than left ethmoid, and right greater than left maxillary sinuses.     2.  Leftward spurring of the nasal septum.      No results found for this or any previous visit (from the past week).

## 2025-05-21 ENCOUNTER — OFFICE VISIT (OUTPATIENT)
Dept: PULMONOLOGY | Facility: CLINIC | Age: 80
End: 2025-05-21
Payer: COMMERCIAL

## 2025-05-21 VITALS
WEIGHT: 172 LBS | HEART RATE: 110 BPM | OXYGEN SATURATION: 96 % | SYSTOLIC BLOOD PRESSURE: 122 MMHG | BODY MASS INDEX: 33.59 KG/M2 | RESPIRATION RATE: 14 BRPM | TEMPERATURE: 96.7 F | DIASTOLIC BLOOD PRESSURE: 74 MMHG

## 2025-05-21 DIAGNOSIS — R59.1 LYMPHADENOPATHY: Primary | ICD-10-CM

## 2025-05-21 DIAGNOSIS — J45.909 MODERATE ASTHMA WITHOUT COMPLICATION, UNSPECIFIED WHETHER PERSISTENT: ICD-10-CM

## 2025-05-21 PROCEDURE — 3074F SYST BP LT 130 MM HG: CPT

## 2025-05-21 PROCEDURE — 99214 OFFICE O/P EST MOD 30 MIN: CPT

## 2025-05-21 PROCEDURE — 3078F DIAST BP <80 MM HG: CPT

## 2025-05-21 PROCEDURE — 1126F AMNT PAIN NOTED NONE PRSNT: CPT

## 2025-05-21 RX ORDER — FLUTICASONE PROPIONATE AND SALMETEROL 100; 50 UG/1; UG/1
1 POWDER RESPIRATORY (INHALATION) EVERY 12 HOURS
Qty: 60 EACH | Refills: 11 | Status: SHIPPED | OUTPATIENT
Start: 2025-05-21

## 2025-05-21 ASSESSMENT — PAIN SCALES - GENERAL: PAINLEVEL_OUTOF10: NO PAIN (0)

## 2025-05-29 ENCOUNTER — HOSPITAL ENCOUNTER (OUTPATIENT)
Dept: CT IMAGING | Facility: CLINIC | Age: 80
End: 2025-05-29
Payer: COMMERCIAL

## 2025-05-29 LAB
CREAT BLD-MCNC: 1.1 MG/DL (ref 0.5–1)
EGFRCR SERPLBLD CKD-EPI 2021: 51 ML/MIN/1.73M2

## 2025-06-10 ENCOUNTER — RESULTS FOLLOW-UP (OUTPATIENT)
Dept: PULMONOLOGY | Facility: CLINIC | Age: 80
End: 2025-06-10

## 2025-08-13 ENCOUNTER — MYC MEDICAL ADVICE (OUTPATIENT)
Dept: NEUROLOGY | Facility: CLINIC | Age: 80
End: 2025-08-13
Payer: COMMERCIAL

## 2025-08-13 DIAGNOSIS — G20.C PRIMARY PARKINSONISM (H): ICD-10-CM

## 2025-08-13 DIAGNOSIS — G25.2 RESTING TREMOR: ICD-10-CM

## 2025-08-14 RX ORDER — CARBIDOPA AND LEVODOPA 25; 100 MG/1; MG/1
1.5 TABLET ORAL 3 TIMES DAILY
Qty: 135 TABLET | Refills: 4 | Status: SHIPPED | OUTPATIENT
Start: 2025-08-14